# Patient Record
Sex: MALE | Race: WHITE | NOT HISPANIC OR LATINO | Employment: UNEMPLOYED | URBAN - METROPOLITAN AREA
[De-identification: names, ages, dates, MRNs, and addresses within clinical notes are randomized per-mention and may not be internally consistent; named-entity substitution may affect disease eponyms.]

---

## 2023-10-18 ENCOUNTER — OFFICE VISIT (OUTPATIENT)
Dept: MEDICAL GROUP | Facility: MEDICAL CENTER | Age: 53
End: 2023-10-18
Payer: MEDICAID

## 2023-10-18 VITALS
HEIGHT: 70 IN | DIASTOLIC BLOOD PRESSURE: 80 MMHG | TEMPERATURE: 97 F | WEIGHT: 134.4 LBS | SYSTOLIC BLOOD PRESSURE: 130 MMHG | RESPIRATION RATE: 16 BRPM | OXYGEN SATURATION: 96 % | HEART RATE: 88 BPM | BODY MASS INDEX: 19.24 KG/M2

## 2023-10-18 DIAGNOSIS — Z12.11 SCREENING FOR COLORECTAL CANCER: ICD-10-CM

## 2023-10-18 DIAGNOSIS — R06.02 SHORTNESS OF BREATH: ICD-10-CM

## 2023-10-18 DIAGNOSIS — Z12.12 SCREENING FOR COLORECTAL CANCER: ICD-10-CM

## 2023-10-18 DIAGNOSIS — Z13.21 SCREENING FOR ENDOCRINE, NUTRITIONAL, METABOLIC AND IMMUNITY DISORDER: ICD-10-CM

## 2023-10-18 DIAGNOSIS — Z13.29 SCREENING FOR ENDOCRINE, NUTRITIONAL, METABOLIC AND IMMUNITY DISORDER: ICD-10-CM

## 2023-10-18 DIAGNOSIS — Z11.59 NEED FOR HEPATITIS C SCREENING TEST: ICD-10-CM

## 2023-10-18 DIAGNOSIS — R39.15 URINARY URGENCY: ICD-10-CM

## 2023-10-18 DIAGNOSIS — Z23 NEED FOR VACCINATION: ICD-10-CM

## 2023-10-18 DIAGNOSIS — Z13.0 SCREENING FOR ENDOCRINE, NUTRITIONAL, METABOLIC AND IMMUNITY DISORDER: ICD-10-CM

## 2023-10-18 DIAGNOSIS — Z11.3 SCREENING EXAMINATION FOR SEXUALLY TRANSMITTED DISEASE: ICD-10-CM

## 2023-10-18 DIAGNOSIS — Z13.228 SCREENING FOR ENDOCRINE, NUTRITIONAL, METABOLIC AND IMMUNITY DISORDER: ICD-10-CM

## 2023-10-18 PROCEDURE — 99204 OFFICE O/P NEW MOD 45 MIN: CPT

## 2023-10-18 PROCEDURE — 3079F DIAST BP 80-89 MM HG: CPT

## 2023-10-18 PROCEDURE — 90471 IMMUNIZATION ADMIN: CPT

## 2023-10-18 PROCEDURE — 99205 OFFICE O/P NEW HI 60 MIN: CPT

## 2023-10-18 PROCEDURE — 3075F SYST BP GE 130 - 139MM HG: CPT

## 2023-10-18 RX ORDER — TAMSULOSIN HYDROCHLORIDE 0.4 MG/1
0.4 CAPSULE ORAL
Qty: 30 CAPSULE | Refills: 5 | Status: SHIPPED | OUTPATIENT
Start: 2023-10-18 | End: 2023-11-14 | Stop reason: SDUPTHER

## 2023-10-18 RX ORDER — ALBUTEROL SULFATE 90 UG/1
2 AEROSOL, METERED RESPIRATORY (INHALATION) EVERY 6 HOURS PRN
Qty: 8.5 G | Refills: 5 | Status: SHIPPED | OUTPATIENT
Start: 2023-10-18 | End: 2023-12-22 | Stop reason: SDUPTHER

## 2023-10-18 RX ORDER — ALBUTEROL SULFATE 90 UG/1
2 AEROSOL, METERED RESPIRATORY (INHALATION) EVERY 6 HOURS PRN
Qty: 8.5 G | Refills: 5 | Status: SHIPPED | OUTPATIENT
Start: 2023-10-18 | End: 2023-10-18

## 2023-10-18 ASSESSMENT — ENCOUNTER SYMPTOMS
DIZZINESS: 0
VOMITING: 0
BLURRED VISION: 0
COUGH: 1
WHEEZING: 1
PALPITATIONS: 0
CHILLS: 0
FEVER: 0
HEMOPTYSIS: 0
HEADACHES: 0
ABDOMINAL PAIN: 0
SORE THROAT: 0
SPUTUM PRODUCTION: 1
NAUSEA: 0
SHORTNESS OF BREATH: 1
MYALGIAS: 0

## 2023-10-18 ASSESSMENT — PATIENT HEALTH QUESTIONNAIRE - PHQ9
5. POOR APPETITE OR OVEREATING: 3 - NEARLY EVERY DAY
CLINICAL INTERPRETATION OF PHQ2 SCORE: 1
SUM OF ALL RESPONSES TO PHQ QUESTIONS 1-9: 10

## 2023-10-18 NOTE — PROGRESS NOTES
"Subjective:     CC:  Diagnoses of Urinary urgency, Shortness of breath, Screening for colorectal cancer, Screening examination for sexually transmitted disease, Need for hepatitis C screening test, Screening for endocrine, nutritional, metabolic and immunity disorder, and Need for vaccination were pertinent to this visit.    HISTORY OF THE PRESENT ILLNESS: Patient is a 53 y.o. male. This pleasant patient is here today to establish care and discuss urinary urgency.    Problem   Urinary Urgency    Patient reports that this year he has noticed some urinary urgency, frequency, and hesitancy. He feels that its related to his prostate. He denies any blood in his urine. He reports it as being like \"milking a cow\". He has difficulty initiating and maintaining his stream.      Shortness of Breath    This is a acute on chronic condition.    Onset: Symptoms present since age 52  Symptoms include:  Cough: +   Wheezing: +  Details: worse on the back, worse with URI, worse with exercise, and worse when on his side  Severity:   They occur 3 per day and are gradually worsening.  Problems with exercise induced coughing, wheezing, or shortness of breath?  Yes, describe   Has sleep been disturbed due to symptoms: No    Current medications: none         Health Maintenance: Completed    ROS:   Review of Systems   Constitutional:  Negative for chills and fever.   HENT:  Negative for sore throat.    Eyes:  Negative for blurred vision.   Respiratory:  Positive for cough, sputum production, shortness of breath and wheezing. Negative for hemoptysis.    Cardiovascular:  Negative for chest pain, palpitations and leg swelling.   Gastrointestinal:  Negative for abdominal pain, nausea and vomiting.   Genitourinary:  Positive for frequency. Negative for dysuria.   Musculoskeletal:  Negative for myalgias.   Skin:  Negative for rash.   Neurological:  Negative for dizziness and headaches.               Social History     Socioeconomic History    " "Marital status: Single     Spouse name: Not on file    Number of children: Not on file    Years of education: Not on file    Highest education level: Not on file   Occupational History    Not on file   Tobacco Use    Smoking status: Every Day     Current packs/day: 0.10     Average packs/day: 0.1 packs/day for 35.8 years (3.6 ttl pk-yrs)     Types: Cigarettes     Start date: 1988    Smokeless tobacco: Never   Substance and Sexual Activity    Alcohol use: Never    Drug use: Yes     Frequency: 7.0 times per week     Types: Marijuana    Sexual activity: Not Currently   Other Topics Concern    Not on file   Social History Narrative    Not on file     Social Determinants of Health     Financial Resource Strain: Not on file   Food Insecurity: Not on file   Transportation Needs: Not on file   Physical Activity: Not on file   Stress: Not on file   Social Connections: Not on file   Intimate Partner Violence: Not on file   Housing Stability: Not on file      Allergies   Allergen Reactions    Sucralose             Current Outpatient Medications:     tamsulosin, 0.4 mg, Oral, AFTER BREAKFAST    albuterol, 2 Puff, Inhalation, Q6HRS PRN   Objective:     Exam: /80 (BP Location: Right arm, Patient Position: Sitting, BP Cuff Size: Adult)   Pulse 88   Temp 36.1 °C (97 °F) (Temporal)   Resp 16   Ht 1.778 m (5' 10\")   Wt 61 kg (134 lb 6.4 oz)   SpO2 96%  Body mass index is 19.28 kg/m².    Physical Exam  Constitutional:       Appearance: Normal appearance.   HENT:      Head: Normocephalic and atraumatic.      Right Ear: Tympanic membrane and ear canal normal.      Left Ear: Tympanic membrane and ear canal normal.      Mouth/Throat:      Mouth: Mucous membranes are moist.      Pharynx: Oropharynx is clear.   Eyes:      Extraocular Movements: Extraocular movements intact.      Pupils: Pupils are equal, round, and reactive to light.   Neck:      Thyroid: No thyromegaly.   Cardiovascular:      Rate and Rhythm: Normal rate and " regular rhythm.      Heart sounds: Normal heart sounds.   Pulmonary:      Effort: Pulmonary effort is normal.      Breath sounds: Wheezing and rhonchi present.   Abdominal:      General: Abdomen is flat. Bowel sounds are normal.      Palpations: Abdomen is soft.   Musculoskeletal:      Cervical back: Normal range of motion and neck supple.      Right lower leg: No edema.      Left lower leg: No edema.   Lymphadenopathy:      Cervical: No cervical adenopathy.   Skin:     General: Skin is warm and dry.   Neurological:      General: No focal deficit present.      Mental Status: He is alert.           Labs: Reviewed    Assessment & Plan:   53 y.o. male with the following -      1. Urinary urgency  Newly reported, stable.  Patient reports that its been worsening over time reports faculty initiating and maintaining his stream.  He feels that it is related to his prostate.  He denies any hematuria or dysuria.  - PROSTATE SPECIFIC AG SCREENING  - tamsulosin (FLOMAX) 0.4 MG capsule; Take 1 Capsule by mouth 1/2 hour after breakfast.  Dispense: 30 Capsule; Refill: 5    2. Shortness of breath  Newly reported, stable.  Patient reports exertional dyspnea and cough.  He has smoked for 30+ years and continues to smoke.  While he has reduced his cigarette intake to 3 cigarettes/day he is not ready to quit yet.  We discussed the likelihood that his illness is COPD.  Discussed as needed albuterol use when short of breath.  - albuterol 108 (90 Base) MCG/ACT Aero Soln inhalation aerosol; Inhale 2 Puffs every 6 hours as needed for Shortness of Breath.  Dispense: 8.5 g; Refill: 5    3. Screening for colorectal cancer  - OCCULT BLOOD FECES IMMUNOASSAY (FIT); Future    4. Screening examination for sexually transmitted disease    - HIV AG/AB COMBO ASSAY SCREENING; Future  - T.PALLIDUM AB JAKE (SCREENING)    5. Need for hepatitis C screening test  - HEP C VIRUS ANTIBODY; Future    6. Screening for endocrine, nutritional, metabolic and immunity  disorder  - Lipid Profile; Future  - HEMOGLOBIN A1C; Future  - Comp Metabolic Panel; Future  - CBC WITHOUT DIFFERENTIAL    7. Need for vaccination  - Tdap Vaccine =>6YO IM    I spent a total of 61 minutes with record review, exam, communication with the patient, communication with other providers, and documentation of this encounter.      Return in about 1 week (around 10/25/2023).    Please note that this dictation was created using voice recognition software. I have made every reasonable attempt to correct obvious errors, but I expect that there are errors of grammar and possibly content that I did not discover before finalizing the note.

## 2023-10-25 ENCOUNTER — OFFICE VISIT (OUTPATIENT)
Dept: MEDICAL GROUP | Facility: MEDICAL CENTER | Age: 53
End: 2023-10-25
Payer: MEDICAID

## 2023-10-25 VITALS
HEIGHT: 70 IN | DIASTOLIC BLOOD PRESSURE: 80 MMHG | SYSTOLIC BLOOD PRESSURE: 110 MMHG | HEART RATE: 88 BPM | TEMPERATURE: 97.9 F | BODY MASS INDEX: 19.1 KG/M2 | WEIGHT: 133.4 LBS | OXYGEN SATURATION: 94 % | RESPIRATION RATE: 16 BRPM

## 2023-10-25 DIAGNOSIS — R06.02 SHORTNESS OF BREATH: ICD-10-CM

## 2023-10-25 DIAGNOSIS — R59.1 LYMPHADENOPATHY: ICD-10-CM

## 2023-10-25 DIAGNOSIS — R63.4 UNINTENTIONAL WEIGHT LOSS: ICD-10-CM

## 2023-10-25 DIAGNOSIS — Z23 NEED FOR VACCINATION: ICD-10-CM

## 2023-10-25 PROCEDURE — 3074F SYST BP LT 130 MM HG: CPT

## 2023-10-25 PROCEDURE — 99214 OFFICE O/P EST MOD 30 MIN: CPT

## 2023-10-25 PROCEDURE — 3079F DIAST BP 80-89 MM HG: CPT

## 2023-10-25 PROCEDURE — 99213 OFFICE O/P EST LOW 20 MIN: CPT | Mod: 25

## 2023-10-25 PROCEDURE — 90471 IMMUNIZATION ADMIN: CPT

## 2023-10-25 RX ORDER — INHALER, ASSIST DEVICES
1 SPACER (EA) MISCELLANEOUS ONCE
Qty: 1 EACH | Refills: 0 | Status: SHIPPED | OUTPATIENT
Start: 2023-10-25 | End: 2023-10-25

## 2023-10-25 RX ORDER — BUDESONIDE AND FORMOTEROL FUMARATE DIHYDRATE 80; 4.5 UG/1; UG/1
2 AEROSOL RESPIRATORY (INHALATION) 2 TIMES DAILY
Qty: 6.9 G | Refills: 5 | Status: SHIPPED | OUTPATIENT
Start: 2023-10-25 | End: 2023-12-22 | Stop reason: SDUPTHER

## 2023-10-25 ASSESSMENT — ENCOUNTER SYMPTOMS
NAUSEA: 0
PALPITATIONS: 0
VOMITING: 0
SHORTNESS OF BREATH: 1
WHEEZING: 0
WEIGHT LOSS: 1
BLOOD IN STOOL: 0
CHILLS: 0
SPUTUM PRODUCTION: 1
COUGH: 1
HEMOPTYSIS: 0
FEVER: 0
DEPRESSION: 0

## 2023-10-25 NOTE — PROGRESS NOTES
"Subjective:     CC: follow up on weight loss    HPI:   Gabriel presents today with    Problem   Unintentional Weight Loss    Patient first noticed his weight loss early this year. He has lost 10 lbs. He reports  Lower appetite but other than that minimal changes. He uses marijuana and tobacco and occasional cocaine use which affect his appetite for a few days. He reports that he gets full fast and even with small portions he gets full.     Medications: None   Emotional: He reports it may be related to his mood, frustration and energy levels.    Alcoholism: None   Late-life paranoia or bereavement: None   Swallowing problems: Denies       Oral factors: bad teeth but not a new problem   Nosocomial infections: Denies       Wandering and other dementia-related factors: Denies   Hyperthyroidism, hypercalcemia, hypoadrenalism: None   Enteral problems: Denies any swallowing or bowel problems   Eating problems: Denies   Low salt, low cholesterol: No special diet   Social isolation: \"a little\"          Lymphadenopathy    Patient reports swollen lymphnodes at the base of his skull, below his jaw, and down his neck. He is concerned as they fluctuate in size getting as large as a golf ball per his daughter. He reports that they are painful but he never has S/S of infection. He reports he usually goes to bed an they are resolved in the morning.      Shortness of Breath    This is a acute on chronic condition.    Onset: Symptoms present since age 52  Symptoms include:   Cough, Wheezing, SOB and sputum production.  He reports the symptoms are worse with exceriton and in the morning.   Severity: he has been using his inhaler multiple times daily with moderate relief of symptoms.   Has sleep been disturbed due to symptoms: No    Current medications: albuterol PRN               ROS:  Review of Systems   Constitutional:  Positive for malaise/fatigue and weight loss. Negative for chills and fever.   Respiratory:  Positive for cough, " "sputum production and shortness of breath. Negative for hemoptysis and wheezing.    Cardiovascular:  Negative for chest pain and palpitations.   Gastrointestinal:  Negative for blood in stool, melena, nausea and vomiting.   Genitourinary:  Positive for frequency.   Psychiatric/Behavioral:  Negative for depression.         Please see HPI for additional ROS.       Objective:     Exam:  /80 (BP Location: Right arm, Patient Position: Sitting, BP Cuff Size: Adult)   Pulse 88   Temp 36.6 °C (97.9 °F) (Temporal)   Resp 16   Ht 1.778 m (5' 10\")   Wt 60.5 kg (133 lb 6.4 oz)   SpO2 94%   BMI 19.14 kg/m²  Body mass index is 19.14 kg/m².    Physical Exam  Constitutional:       Appearance: Normal appearance.   Neck:     Cardiovascular:      Rate and Rhythm: Normal rate and regular rhythm.      Heart sounds: Normal heart sounds.   Pulmonary:      Effort: Pulmonary effort is normal.      Breath sounds: Normal breath sounds.   Musculoskeletal:      Cervical back: Normal range of motion and neck supple. Pain with movement present.   Lymphadenopathy:      Cervical: Cervical adenopathy present.      Right cervical: Superficial cervical adenopathy present.      Left cervical: Posterior cervical adenopathy present.   Neurological:      Mental Status: He is alert.         Labs: Reviewed    Assessment & Plan:     53 y.o. male with the following -     1. Unintentional weight loss  Chronic, unsure etiology, prognosis uncertain.  Patient reports losing over 10 pounds in the last few months.  His diet has not changed much.  When reviewing the 'Meals on Wheels', only abnormal finding was his relation to his mood and his appetite.  He reports getting full fast.  Given his weight loss, lymphadenopathy, and smoking history labs and imaging ordered.  Follow-up precautions given.  - TSH WITH REFLEX TO FT4; Future  - CRP QUANTITIVE (NON-CARDIAC); Future  - DX-CHEST-2 VIEWS; Future  - Sed Rate; Future    2. Lymphadenopathy  Chronic, " unsure etiology, prognosis uncertain.  Patient has cervical adenopathy.  Right-sided superficial cervical lymph node swelling and posterior cervical lymph node swelling on the left.    3. Shortness of breath  Chronic, improving.  Patient reports that since he started taking the albuterol his shortness of breath has improved some but he is using the albuterol for as times per day.  He reports chronic cough chronic shortness of breath and chronic sputum production.  Given his extensive smoking history it is likely that he has chronic lung disease.  Pulmonary function test, chest x-ray, Symbicort ordered in addition to a spacer.  Pneumococcal 20 vaccine given today patient denies influenza vaccination.  - DX-CHEST-2 VIEWS; Future  - PULMONARY FUNCTION TESTS -Test requested: Complete Pulmonary Function Test; Future  - budesonide-formoterol (SYMBICORT) 80-4.5 MCG/ACT Aerosol; Inhale 2 Puffs 2 times a day.  Dispense: 6.9 g; Refill: 5  - Spacer/Aero-Holding Chambers (AEROCHAMBER MV) Misc; 1 Each one time for 1 dose.  Dispense: 1 Each; Refill: 0    4. Need for vaccination  - Pneumococcal Conjugate Vaccine 20-Valent (6 wks+)            Return in about 4 weeks (around 11/22/2023) for FU Labs, Med Check.    Please note that this dictation was created using voice recognition software. I have made every reasonable attempt to correct obvious errors, but I expect that there are errors of grammar and possibly content that I did not discover before finalizing the note.

## 2023-11-02 ENCOUNTER — HOSPITAL ENCOUNTER (OUTPATIENT)
Dept: RADIOLOGY | Facility: MEDICAL CENTER | Age: 53
End: 2023-11-02
Payer: MEDICAID

## 2023-11-02 DIAGNOSIS — R06.02 SHORTNESS OF BREATH: ICD-10-CM

## 2023-11-02 DIAGNOSIS — R63.4 UNINTENTIONAL WEIGHT LOSS: ICD-10-CM

## 2023-11-02 PROCEDURE — 71046 X-RAY EXAM CHEST 2 VIEWS: CPT

## 2023-11-14 DIAGNOSIS — R39.15 URINARY URGENCY: ICD-10-CM

## 2023-11-14 RX ORDER — TAMSULOSIN HYDROCHLORIDE 0.4 MG/1
0.4 CAPSULE ORAL
Qty: 30 CAPSULE | Refills: 5 | Status: SHIPPED | OUTPATIENT
Start: 2023-11-14

## 2023-11-20 ENCOUNTER — APPOINTMENT (OUTPATIENT)
Dept: PULMONOLOGY | Facility: MEDICAL CENTER | Age: 53
End: 2023-11-20
Payer: MEDICAID

## 2023-12-02 ENCOUNTER — HOSPITAL ENCOUNTER (OUTPATIENT)
Dept: LAB | Facility: MEDICAL CENTER | Age: 53
End: 2023-12-02
Payer: MEDICAID

## 2023-12-02 DIAGNOSIS — Z11.3 SCREENING EXAMINATION FOR SEXUALLY TRANSMITTED DISEASE: ICD-10-CM

## 2023-12-02 DIAGNOSIS — Z13.228 SCREENING FOR ENDOCRINE, NUTRITIONAL, METABOLIC AND IMMUNITY DISORDER: ICD-10-CM

## 2023-12-02 DIAGNOSIS — Z13.21 SCREENING FOR ENDOCRINE, NUTRITIONAL, METABOLIC AND IMMUNITY DISORDER: ICD-10-CM

## 2023-12-02 DIAGNOSIS — Z13.29 SCREENING FOR ENDOCRINE, NUTRITIONAL, METABOLIC AND IMMUNITY DISORDER: ICD-10-CM

## 2023-12-02 DIAGNOSIS — R63.4 UNINTENTIONAL WEIGHT LOSS: ICD-10-CM

## 2023-12-02 DIAGNOSIS — Z13.0 SCREENING FOR ENDOCRINE, NUTRITIONAL, METABOLIC AND IMMUNITY DISORDER: ICD-10-CM

## 2023-12-02 DIAGNOSIS — Z11.59 NEED FOR HEPATITIS C SCREENING TEST: ICD-10-CM

## 2023-12-02 LAB
ALBUMIN SERPL BCP-MCNC: 4.1 G/DL (ref 3.2–4.9)
ALBUMIN/GLOB SERPL: 1.2 G/DL
ALP SERPL-CCNC: 120 U/L (ref 30–99)
ALT SERPL-CCNC: 50 U/L (ref 2–50)
ANION GAP SERPL CALC-SCNC: 11 MMOL/L (ref 7–16)
AST SERPL-CCNC: 40 U/L (ref 12–45)
BILIRUB SERPL-MCNC: 0.3 MG/DL (ref 0.1–1.5)
BUN SERPL-MCNC: 21 MG/DL (ref 8–22)
CALCIUM ALBUM COR SERPL-MCNC: 9.5 MG/DL (ref 8.5–10.5)
CALCIUM SERPL-MCNC: 9.6 MG/DL (ref 8.5–10.5)
CHLORIDE SERPL-SCNC: 104 MMOL/L (ref 96–112)
CHOLEST SERPL-MCNC: 187 MG/DL (ref 100–199)
CO2 SERPL-SCNC: 24 MMOL/L (ref 20–33)
CREAT SERPL-MCNC: 0.85 MG/DL (ref 0.5–1.4)
CRP SERPL HS-MCNC: <0.3 MG/DL (ref 0–0.75)
ERYTHROCYTE [SEDIMENTATION RATE] IN BLOOD BY WESTERGREN METHOD: 6 MM/HOUR (ref 0–20)
FASTING STATUS PATIENT QL REPORTED: NORMAL
GFR SERPLBLD CREATININE-BSD FMLA CKD-EPI: 102 ML/MIN/1.73 M 2
GLOBULIN SER CALC-MCNC: 3.3 G/DL (ref 1.9–3.5)
GLUCOSE SERPL-MCNC: 86 MG/DL (ref 65–99)
HCV AB SER QL: NORMAL
HDLC SERPL-MCNC: 52 MG/DL
HIV 1+2 AB+HIV1 P24 AG SERPL QL IA: ABNORMAL
LDLC SERPL CALC-MCNC: 99 MG/DL
POTASSIUM SERPL-SCNC: 4.2 MMOL/L (ref 3.6–5.5)
PROT SERPL-MCNC: 7.4 G/DL (ref 6–8.2)
SODIUM SERPL-SCNC: 139 MMOL/L (ref 135–145)
TRIGL SERPL-MCNC: 181 MG/DL (ref 0–149)
TSH SERPL DL<=0.005 MIU/L-ACNC: 2.86 UIU/ML (ref 0.38–5.33)

## 2023-12-02 PROCEDURE — 86701 HIV-1ANTIBODY: CPT | Mod: XU

## 2023-12-02 PROCEDURE — 84443 ASSAY THYROID STIM HORMONE: CPT

## 2023-12-02 PROCEDURE — 87536 HIV-1 QUANT&REVRSE TRNSCRPJ: CPT

## 2023-12-02 PROCEDURE — 86803 HEPATITIS C AB TEST: CPT

## 2023-12-02 PROCEDURE — 86140 C-REACTIVE PROTEIN: CPT

## 2023-12-02 PROCEDURE — 86702 HIV-2 ANTIBODY: CPT | Mod: XU

## 2023-12-02 PROCEDURE — 87389 HIV-1 AG W/HIV-1&-2 AB AG IA: CPT

## 2023-12-02 PROCEDURE — 36415 COLL VENOUS BLD VENIPUNCTURE: CPT

## 2023-12-02 PROCEDURE — 80053 COMPREHEN METABOLIC PANEL: CPT

## 2023-12-02 PROCEDURE — 80061 LIPID PANEL: CPT

## 2023-12-02 PROCEDURE — 83036 HEMOGLOBIN GLYCOSYLATED A1C: CPT

## 2023-12-02 PROCEDURE — 85652 RBC SED RATE AUTOMATED: CPT

## 2023-12-03 LAB
EST. AVERAGE GLUCOSE BLD GHB EST-MCNC: 120 MG/DL
HBA1C MFR BLD: 5.8 % (ref 4–5.6)

## 2023-12-04 ENCOUNTER — TELEPHONE (OUTPATIENT)
Dept: MEDICAL GROUP | Facility: MEDICAL CENTER | Age: 53
End: 2023-12-04
Payer: MEDICAID

## 2023-12-04 NOTE — TELEPHONE ENCOUNTER
Message: spoke with lab they have informed me that patient came back for HIV Pos they will be sending lab to more testing charu munson has been informed in person and regards this she stated she will communicate this to patient.

## 2023-12-05 LAB
HIV 1 & 2 AB SER-IMP: NORMAL
HIV 1 & 2 AB SERPL IA.RAPID: NORMAL
HIV 2 AB SERPL QL IA: NEGATIVE
HIV1 AB SERPL QL IA: NEGATIVE

## 2023-12-07 LAB
HIV-1 NAAT (COPIES/ML) L204479A: NOT DETECTED CPY/ML
HIV-1 NAAT (LOG COPIES/ML) L295410: NOT DETECTED LOG CPY/ML
HIV1 RNA SERPL QL NAA+PROBE: NOT DETECTED

## 2023-12-11 ENCOUNTER — HOSPITAL ENCOUNTER (OUTPATIENT)
Dept: PULMONOLOGY | Facility: MEDICAL CENTER | Age: 53
End: 2023-12-11
Payer: MEDICAID

## 2023-12-11 DIAGNOSIS — R06.02 SHORTNESS OF BREATH: ICD-10-CM

## 2023-12-11 PROCEDURE — 94060 EVALUATION OF WHEEZING: CPT

## 2023-12-11 PROCEDURE — 94729 DIFFUSING CAPACITY: CPT

## 2023-12-11 PROCEDURE — 94726 PLETHYSMOGRAPHY LUNG VOLUMES: CPT

## 2023-12-11 RX ADMIN — Medication 2.5 MG: at 12:18

## 2023-12-11 RX ADMIN — Medication 2.5 MG: at 11:19

## 2023-12-11 ASSESSMENT — PULMONARY FUNCTION TESTS
FVC_PERCENT_PREDICTED: 95
FEV1/FVC: 72.14
FEV1/FVC_PERCENT_CHANGE: 175
FVC_LLN: 4.03
FVC_PREDICTED: 4.83
FEV1/FVC_PERCENT_PREDICTED: 89
FEV1/FVC_PERCENT_LLN: 65
FVC_PERCENT_PREDICTED: 91
FVC: 4.41
FEV1/FVC: 69
FEV1/FVC_PERCENT_PREDICTED: 78
FVC_LLN: 4.03
FEV1/FVC_PREDICTED: 78
FEV1/FVC_PERCENT_CHANGE: 4
FEV1_PERCENT_CHANGE: 4
FEV1/FVC_PERCENT_PREDICTED: 93
FEV1_PERCENT_PREDICTED: 81
FEV1_PERCENT_PREDICTED: 88
FEV1/FVC: 72
FEV1: 3.06
FEV1_LLN: 3.15
FEV1/FVC: 69
FEV1: 3.34
FEV1/FVC_PERCENT_PREDICTED: 88
FEV1_PREDICTED: 3.77
FVC: 4.63
FEV1/FVC_PERCENT_LLN: 65
FEV1/FVC_PERCENT_PREDICTED: 92
FEV1_PERCENT_CHANGE: 7
FEV1_LLN: 3.15

## 2023-12-12 ENCOUNTER — OFFICE VISIT (OUTPATIENT)
Dept: MEDICAL GROUP | Facility: MEDICAL CENTER | Age: 53
End: 2023-12-12
Payer: MEDICAID

## 2023-12-12 VITALS
WEIGHT: 134.5 LBS | HEART RATE: 85 BPM | OXYGEN SATURATION: 94 % | SYSTOLIC BLOOD PRESSURE: 105 MMHG | DIASTOLIC BLOOD PRESSURE: 80 MMHG | RESPIRATION RATE: 16 BRPM | HEIGHT: 70 IN | TEMPERATURE: 98.6 F | BODY MASS INDEX: 19.26 KG/M2

## 2023-12-12 DIAGNOSIS — R73.03 PREDIABETES: ICD-10-CM

## 2023-12-12 DIAGNOSIS — J41.8 MIXED SIMPLE AND MUCOPURULENT CHRONIC BRONCHITIS (HCC): ICD-10-CM

## 2023-12-12 DIAGNOSIS — Z23 NEED FOR VACCINATION: ICD-10-CM

## 2023-12-12 PROCEDURE — 3074F SYST BP LT 130 MM HG: CPT

## 2023-12-12 PROCEDURE — 99213 OFFICE O/P EST LOW 20 MIN: CPT

## 2023-12-12 PROCEDURE — 3079F DIAST BP 80-89 MM HG: CPT

## 2023-12-12 PROCEDURE — 99212 OFFICE O/P EST SF 10 MIN: CPT

## 2023-12-12 ASSESSMENT — ENCOUNTER SYMPTOMS
SHORTNESS OF BREATH: 0
COUGH: 0
CHILLS: 0
PALPITATIONS: 0
FEVER: 0

## 2023-12-12 NOTE — PROGRESS NOTES
"Subjective:     CC: medication follow up and lab review.     HPI:   Gabriel presents today to follow-up on his most recent labs and pulmonary function test.  His initial HIV screening was positive however further testing was negative.  His labs did demonstrate that he has some prediabetes and elevated triglycerides.  Patient reports that he does not eat much red meat smoothies with fruits and vegetables and yogurt.  He also eats nutritional breakfast shakes.  He completed his pulmonary function test yesterday.  He has been using his Symbicort and albuterol as prescribed.  He did report that the 24 hours leading up to his pulmonary function test when he was not using the medication he did notice how much the medication will help him with his daily symptoms.      ROS:  Review of Systems   Constitutional:  Negative for chills and fever.   Respiratory:  Negative for cough and shortness of breath.    Cardiovascular:  Negative for chest pain and palpitations.        Please see HPI for additional ROS.       Objective:     Exam:  /80 (BP Location: Right arm, Patient Position: Sitting, BP Cuff Size: Adult)   Pulse 85   Temp 37 °C (98.6 °F) (Temporal)   Resp 16   Ht 1.778 m (5' 10\")   Wt 61 kg (134 lb 8 oz)   SpO2 94%   BMI 19.30 kg/m²  Body mass index is 19.3 kg/m².    Physical Exam:  General: Pt resting in NAD, cooperative   Skin:  No cyanosis or jaundice   HEENT: NC/AT. EOMI. No conjunctival injection or sclera icterus.   Lungs:  CTAB, good air movement. Non-labored. +cough  Cardiovascular:  S1/S2 RRR   Abdomen:  Abdomen is soft, non-tender, non-distended, +BS  Extremities:  No lower extremity edema   CNS:  No gross focal neurologic deficits  Psych: Appropriate mood and affect     Labs: Reviewed    Assessment & Plan:     53 y.o. male with the following -     1. Mixed simple and mucopurulent chronic bronchitis (HCC)  Chronic, well controlled with albuterol and Symbicort. Patient denies any negative side effects " Impression: Presbyopia: H52.4. Plan: Patient educated on condition, updated and released SRx. Monitor. from the medication and would like to continue. Follow up precautions given.  Patient declines Pneumovax or flu shot.    2. Need for vaccination  Patient requests vaccination today.  Vaccine not available in the office.  Prescription given.- Zoster Vac Recomb Adjuvanted (SHINGRIX) 50 MCG/0.5ML Recon Susp; Inject 0.5 mL into the shoulder, thigh, or buttocks one time for 1 dose.  Dispense: 0.5 mL; Refill: 0    3. Prediabetes  Hemoglobin A1c 5.8.  Dietary and lifestyle modifications discussed with patient.        Return in about 6 months (around 6/12/2024), or Weight check.    Please note that this dictation was created using voice recognition software. I have made every reasonable attempt to correct obvious errors, but I expect that there are errors of grammar and possibly content that I did not discover before finalizing the note.

## 2023-12-16 PROCEDURE — 94060 EVALUATION OF WHEEZING: CPT | Mod: 26 | Performed by: INTERNAL MEDICINE

## 2023-12-17 NOTE — PROCEDURES
DATE OF SERVICE:  12/11/2023     PULMONARY FUNCTION TEST INTERPRETATION     INTERPRETING PHYSICIAN:  Karen Lomas MD     REASON FOR STUDY:  Shortness of breath.     RESULTS:  SPIROMETRY:  FVC is 4.41 liters, which is 91% predicted without a significant change   postbronchodilator.  FEV1 is 3.06 liters, which is 81% predicted without a significant change   postbronchodilator.  FEV1/FVC is 72.     LUNG VOLUMES:  TLC is 6.79 liters, which is 97% predicted.  RV is 2.34 liters, which is 109% predicted.     DIFFUSION CAPACITY:  DLCO is 28.14, which is 98% predicted.  DL/VA is 4.49, which is 109% predicted.     INTERPRETATION:  1.  Spirometry is normal.  2.  There is no significant change postbronchodilator.  3.  The flow volume loop is incomplete and flattened suggesting possible   extrathoracic obstruction, correlate clinically.  4.  Lung volumes are normal.  5.  Diffusion capacity is normal  6. There are no prior studies for comparison      Dictation Ends Here        ______________________________  MD IVÁN Reed/BELLA    DD:  12/16/2023 17:09  DT:  12/16/2023 18:12    Job#:  [id number]

## 2023-12-22 DIAGNOSIS — R06.02 SHORTNESS OF BREATH: ICD-10-CM

## 2023-12-22 RX ORDER — BUDESONIDE AND FORMOTEROL FUMARATE DIHYDRATE 80; 4.5 UG/1; UG/1
2 AEROSOL RESPIRATORY (INHALATION) 2 TIMES DAILY
Qty: 6.9 G | Refills: 5 | Status: SHIPPED | OUTPATIENT
Start: 2023-12-22

## 2023-12-22 RX ORDER — ALBUTEROL SULFATE 90 UG/1
2 AEROSOL, METERED RESPIRATORY (INHALATION) EVERY 6 HOURS PRN
Qty: 8.5 G | Refills: 5 | Status: SHIPPED | OUTPATIENT
Start: 2023-12-22 | End: 2024-01-28 | Stop reason: SDUPTHER

## 2024-01-28 DIAGNOSIS — R06.02 SHORTNESS OF BREATH: ICD-10-CM

## 2024-01-30 RX ORDER — ALBUTEROL SULFATE 90 UG/1
2 AEROSOL, METERED RESPIRATORY (INHALATION) EVERY 6 HOURS PRN
Qty: 8.5 G | Refills: 5 | Status: SHIPPED | OUTPATIENT
Start: 2024-01-30

## 2024-01-30 NOTE — TELEPHONE ENCOUNTER
Received request via: Pharmacy    Was the patient seen in the last year in this department? Yes    Does the patient have an active prescription (recently filled or refills available) for medication(s) requested? No    Pharmacy Name: [facility]     Does the patient have halfway Plus and need 100 day supply (blood pressure, diabetes and cholesterol meds only)? Patient does not have SCP

## 2024-09-11 ENCOUNTER — OFFICE VISIT (OUTPATIENT)
Dept: MEDICAL GROUP | Facility: MEDICAL CENTER | Age: 54
End: 2024-09-11
Payer: MEDICAID

## 2024-09-11 VITALS
RESPIRATION RATE: 16 BRPM | HEART RATE: 102 BPM | HEIGHT: 71 IN | DIASTOLIC BLOOD PRESSURE: 76 MMHG | BODY MASS INDEX: 19.18 KG/M2 | SYSTOLIC BLOOD PRESSURE: 108 MMHG | OXYGEN SATURATION: 97 % | WEIGHT: 137 LBS | TEMPERATURE: 97 F

## 2024-09-11 DIAGNOSIS — Z13.29 SCREENING FOR ENDOCRINE, NUTRITIONAL, METABOLIC AND IMMUNITY DISORDER: ICD-10-CM

## 2024-09-11 DIAGNOSIS — R39.15 URINARY URGENCY: ICD-10-CM

## 2024-09-11 DIAGNOSIS — Z13.21 SCREENING FOR ENDOCRINE, NUTRITIONAL, METABOLIC AND IMMUNITY DISORDER: ICD-10-CM

## 2024-09-11 DIAGNOSIS — R06.02 SHORTNESS OF BREATH: ICD-10-CM

## 2024-09-11 DIAGNOSIS — Z13.0 SCREENING FOR ENDOCRINE, NUTRITIONAL, METABOLIC AND IMMUNITY DISORDER: ICD-10-CM

## 2024-09-11 DIAGNOSIS — Z13.228 SCREENING FOR ENDOCRINE, NUTRITIONAL, METABOLIC AND IMMUNITY DISORDER: ICD-10-CM

## 2024-09-11 PROCEDURE — 99214 OFFICE O/P EST MOD 30 MIN: CPT

## 2024-09-11 PROCEDURE — 99212 OFFICE O/P EST SF 10 MIN: CPT

## 2024-09-11 PROCEDURE — 3074F SYST BP LT 130 MM HG: CPT

## 2024-09-11 PROCEDURE — 3078F DIAST BP <80 MM HG: CPT

## 2024-09-11 RX ORDER — TAMSULOSIN HYDROCHLORIDE 0.4 MG/1
0.8 CAPSULE ORAL
Qty: 60 CAPSULE | Refills: 5 | Status: SHIPPED | OUTPATIENT
Start: 2024-09-11

## 2024-09-11 RX ORDER — ALBUTEROL SULFATE 90 UG/1
2 INHALANT RESPIRATORY (INHALATION) EVERY 6 HOURS PRN
Qty: 8.5 G | Refills: 5 | Status: SHIPPED | OUTPATIENT
Start: 2024-09-11

## 2024-09-11 RX ORDER — BUDESONIDE AND FORMOTEROL FUMARATE DIHYDRATE 80; 4.5 UG/1; UG/1
2 AEROSOL RESPIRATORY (INHALATION) 2 TIMES DAILY
Qty: 6.9 G | Refills: 5 | Status: SHIPPED | OUTPATIENT
Start: 2024-09-11

## 2024-09-11 ASSESSMENT — PATIENT HEALTH QUESTIONNAIRE - PHQ9: CLINICAL INTERPRETATION OF PHQ2 SCORE: 0

## 2024-09-12 NOTE — PROGRESS NOTES
Verbal consent was acquired by the patient to use [employer] ambient listening note generation during this visit     Subjective:     CC:  Diagnoses of Urinary urgency, Shortness of breath, and Screening for endocrine, nutritional, metabolic and immunity disorder were pertinent to this visit.    HISTORY OF THE PRESENT ILLNESS: Patient is a 54 y.o. male.     History of Present Illness  The patient presents for evaluation of prostate issues.    He reports ongoing problems with his prostate, which he believes may require an increase in medication dosage. He admits to occasionally forgetting to take his medication, and when he remembers, he takes a double dose, which seems to alleviate his symptoms. However, on days when he doesn't take the medication, his symptoms worsen significantly. Even on normal days when he takes his medication as prescribed, he still experiences some discomfort. He describes feeling a lump or obstruction a few times, particularly on days when he ran out of his medication. He has not yet undergone a colonoscopy or completed his PSA test. He also mentions that he often needs to perform aerobic exercises to facilitate urination, which he finds abnormal. He reports no presence of blood or unusual discharge.  I like that it sends reminders to use it for 1 minute/day and the jobs something similar that way and    His weight has been fluctuating, but he currently weighs 138 pounds, up from 134 pounds last year. He notes that his ideal weight is around 140 to 142 pounds.    He is just getting over a migraine from yesterday. He is on the last refill of albuterol.    Health Maintenance: reviewed and completed per patient preference.     ROS:   - CONSTITUTIONAL: Denies weight loss, fever and chills.  - HEENT: Denies changes in vision and hearing.  - RESPIRATORY: Denies SOB and cough.  - CV: Denies palpitations and CP.  - GI: Denies abdominal pain, nausea, vomiting and diarrhea.  - : Denies dysuria and  "urinary frequency.  - MSK: Denies myalgia and joint pain.  - SKIN: Denies rash and pruritus.  - NEUROLOGICAL: Denies headache and syncope.  - PSYCHIATRIC: Denies recent changes in mood. Denies anxiety and depression.           Social History     Socioeconomic History    Marital status:      Spouse name: Not on file    Number of children: Not on file    Years of education: Not on file    Highest education level: Not on file   Occupational History    Not on file   Tobacco Use    Smoking status: Every Day     Current packs/day: 0.10     Average packs/day: 0.1 packs/day for 36.7 years (3.7 ttl pk-yrs)     Types: Cigarettes     Start date: 1988    Smokeless tobacco: Never   Vaping Use    Vaping status: Never Used   Substance and Sexual Activity    Alcohol use: Not Currently    Drug use: Yes     Frequency: 7.0 times per week     Types: Marijuana    Sexual activity: Not Currently   Other Topics Concern    Not on file   Social History Narrative    Not on file     Social Determinants of Health     Financial Resource Strain: Not on file   Food Insecurity: Not on file   Transportation Needs: Not on file   Physical Activity: Not on file   Stress: Not on file   Social Connections: Not on file   Intimate Partner Violence: Not on file   Housing Stability: Not on file      Allergies   Allergen Reactions    Sucralose             Current Outpatient Medications:     tamsulosin, 0.8 mg, Oral, AFTER BREAKFAST    albuterol, 2 Puff, Inhalation, Q6HRS PRN    budesonide-formoterol, 2 Puff, Inhalation, BID   Objective:     Exam: /76 (BP Location: Left arm, Patient Position: Sitting, BP Cuff Size: Adult)   Pulse (!) 102   Temp 36.1 °C (97 °F) (Temporal)   Resp 16   Ht 1.803 m (5' 11\")   Wt 62.1 kg (137 lb)   SpO2 97%  Body mass index is 19.11 kg/m².    Physical Exam:  Constitutional: Alert, no distress, well-groomed.  Skin: Warm, dry, good turgor, no rashes in visible areas.  Eye: Equal, round and reactive, conjunctiva " clear, lids normal.  ENMT: Lips without lesions, good dentition, moist mucous membranes.  Neck: Trachea midline, no masses, no thyromegaly.  Respiratory: Unlabored respiratory effort, no cough.  Abd: soft, non tender, non distended, normal BS  MSK: Normal gait, moves all extremities.  Neuro: Grossly non-focal.   Psych: Alert and oriented x3, normal affect and mood.    Labs: Reviewed    Assessment & Plan:   54 y.o. male with the following -    1. Urinary urgency  Ongoing Issue, He reports intermittent symptoms and occasional obstruction sensations. He has been doubling his Flomax dose on some days, which seems to alleviate symptoms. A PSA test will be conducted to assess prostate health. Flomax dosage will be increased to 0.8 mg, two tablets once daily. A referral to urology will be made after obtaining PSA results. He is advised to complete the blood work as soon as possible.   - PROSTATE SPECIFIC AG DIAGNOSTIC; Future  - tamsulosin (FLOMAX) 0.4 MG capsule; Take 2 Capsules by mouth 1/2 hour after breakfast.  Dispense: 60 Capsule; Refill: 5    2. Shortness of breath  Chronic, stable. His symptoms are present weekly and are exacerbated by exertion. It was recommended that he avoid sick people, allergens such as dander and mold, and avoid smoke. We discussed signs and symptoms that warrant further evaluation.   - albuterol 108 (90 Base) MCG/ACT Aero Soln inhalation aerosol; Inhale 2 Puffs every 6 hours as needed for Shortness of Breath.  Dispense: 8.5 g; Refill: 5  - budesonide-formoterol (SYMBICORT) 80-4.5 MCG/ACT Aerosol; Inhale 2 Puffs 2 times a day.  Dispense: 6.9 g; Refill: 5    3. Screening for endocrine, nutritional, metabolic and immunity disorder  - CBC WITHOUT DIFFERENTIAL; Future  - RPR (SYPHILIS); Future        Return if symptoms worsen or fail to improve.    Please note that this dictation was created using voice recognition software. I have made every reasonable attempt to correct obvious errors, but I  expect that there are errors of grammar and possibly content that I did not discover before finalizing the note.

## 2024-11-23 DIAGNOSIS — R39.15 URINARY URGENCY: ICD-10-CM

## 2024-11-25 NOTE — TELEPHONE ENCOUNTER
Received request via: Pharmacy    Was the patient seen in the last year in this department? Yes    Does the patient have an active prescription (recently filled or refills available) for medication(s) requested? No    [facility]    Does the patient have USP Plus and need 100-day supply? (This applies to ALL medications) Patient does not have SCP

## 2024-11-26 RX ORDER — TAMSULOSIN HYDROCHLORIDE 0.4 MG/1
0.4 CAPSULE ORAL
Qty: 30 CAPSULE | Refills: 5 | Status: SHIPPED | OUTPATIENT
Start: 2024-11-26

## 2024-12-11 ENCOUNTER — HOSPITAL ENCOUNTER (OUTPATIENT)
Dept: LAB | Facility: MEDICAL CENTER | Age: 54
End: 2024-12-11
Payer: MEDICAID

## 2024-12-11 DIAGNOSIS — R39.15 URINARY URGENCY: ICD-10-CM

## 2024-12-11 DIAGNOSIS — Z13.0 SCREENING FOR ENDOCRINE, NUTRITIONAL, METABOLIC AND IMMUNITY DISORDER: ICD-10-CM

## 2024-12-11 DIAGNOSIS — Z13.228 SCREENING FOR ENDOCRINE, NUTRITIONAL, METABOLIC AND IMMUNITY DISORDER: ICD-10-CM

## 2024-12-11 DIAGNOSIS — Z13.21 SCREENING FOR ENDOCRINE, NUTRITIONAL, METABOLIC AND IMMUNITY DISORDER: ICD-10-CM

## 2024-12-11 DIAGNOSIS — Z13.29 SCREENING FOR ENDOCRINE, NUTRITIONAL, METABOLIC AND IMMUNITY DISORDER: ICD-10-CM

## 2024-12-11 LAB
ERYTHROCYTE [DISTWIDTH] IN BLOOD BY AUTOMATED COUNT: 43.8 FL (ref 35.9–50)
HCT VFR BLD AUTO: 45.8 % (ref 42–52)
HGB BLD-MCNC: 14.6 G/DL (ref 14–18)
MCH RBC QN AUTO: 29.6 PG (ref 27–33)
MCHC RBC AUTO-ENTMCNC: 31.9 G/DL (ref 32.3–36.5)
MCV RBC AUTO: 92.7 FL (ref 81.4–97.8)
PLATELET # BLD AUTO: 299 K/UL (ref 164–446)
PMV BLD AUTO: 8.9 FL (ref 9–12.9)
PSA SERPL DL<=0.01 NG/ML-MCNC: 17.9 NG/ML (ref 0–4)
RBC # BLD AUTO: 4.94 M/UL (ref 4.7–6.1)
T PALLIDUM AB SER QL IA: NORMAL
WBC # BLD AUTO: 8.6 K/UL (ref 4.8–10.8)

## 2024-12-11 PROCEDURE — 86780 TREPONEMA PALLIDUM: CPT

## 2024-12-11 PROCEDURE — 36415 COLL VENOUS BLD VENIPUNCTURE: CPT

## 2024-12-11 PROCEDURE — 84153 ASSAY OF PSA TOTAL: CPT

## 2024-12-11 PROCEDURE — 85027 COMPLETE CBC AUTOMATED: CPT

## 2024-12-12 ENCOUNTER — HOSPITAL ENCOUNTER (OUTPATIENT)
Facility: MEDICAL CENTER | Age: 54
End: 2024-12-12
Attending: PHYSICIAN ASSISTANT
Payer: MEDICAID

## 2024-12-12 ENCOUNTER — OFFICE VISIT (OUTPATIENT)
Dept: URGENT CARE | Facility: PHYSICIAN GROUP | Age: 54
End: 2024-12-12
Payer: MEDICAID

## 2024-12-12 VITALS
WEIGHT: 144.1 LBS | TEMPERATURE: 98.3 F | HEIGHT: 70 IN | SYSTOLIC BLOOD PRESSURE: 94 MMHG | OXYGEN SATURATION: 95 % | HEART RATE: 99 BPM | DIASTOLIC BLOOD PRESSURE: 82 MMHG | RESPIRATION RATE: 20 BRPM | BODY MASS INDEX: 20.63 KG/M2

## 2024-12-12 DIAGNOSIS — R97.20 HIGH PROSTATE SPECIFIC ANTIGEN (PSA): ICD-10-CM

## 2024-12-12 DIAGNOSIS — R30.0 DYSURIA: ICD-10-CM

## 2024-12-12 LAB
APPEARANCE UR: NORMAL
BILIRUB UR STRIP-MCNC: NEGATIVE MG/DL
COLOR UR AUTO: NORMAL
GLUCOSE UR STRIP.AUTO-MCNC: NEGATIVE MG/DL
KETONES UR STRIP.AUTO-MCNC: NEGATIVE MG/DL
LEUKOCYTE ESTERASE UR QL STRIP.AUTO: NORMAL
NITRITE UR QL STRIP.AUTO: NEGATIVE
PH UR STRIP.AUTO: 5.5 [PH] (ref 5–8)
PROT UR QL STRIP: >=300 MG/DL
RBC UR QL AUTO: NORMAL
SP GR UR STRIP.AUTO: >=1.03
UROBILINOGEN UR STRIP-MCNC: 0.2 MG/DL

## 2024-12-12 PROCEDURE — 81002 URINALYSIS NONAUTO W/O SCOPE: CPT | Performed by: PHYSICIAN ASSISTANT

## 2024-12-12 PROCEDURE — 87077 CULTURE AEROBIC IDENTIFY: CPT

## 2024-12-12 PROCEDURE — 87086 URINE CULTURE/COLONY COUNT: CPT

## 2024-12-12 PROCEDURE — 99213 OFFICE O/P EST LOW 20 MIN: CPT | Performed by: PHYSICIAN ASSISTANT

## 2024-12-12 RX ORDER — SULFAMETHOXAZOLE AND TRIMETHOPRIM 800; 160 MG/1; MG/1
1 TABLET ORAL 2 TIMES DAILY
Qty: 14 TABLET | Refills: 0 | Status: SHIPPED | OUTPATIENT
Start: 2024-12-12 | End: 2024-12-19

## 2024-12-12 ASSESSMENT — FIBROSIS 4 INDEX: FIB4 SCORE: 1.06

## 2024-12-13 DIAGNOSIS — R30.0 DYSURIA: ICD-10-CM

## 2024-12-13 ASSESSMENT — ENCOUNTER SYMPTOMS
FLANK PAIN: 0
CHILLS: 0
ABDOMINAL PAIN: 1
HEADACHES: 0
FEVER: 0
NAUSEA: 0
VOMITING: 0
BACK PAIN: 0

## 2024-12-13 NOTE — PROGRESS NOTES
"Subjective     Gabriel Duran is a 54 y.o. male who presents with Dysuria (Abdominal pain, penile discharge going on for \"a while\" but the intensity of it has started about 2 months ago)            Dysuria   This is a new problem. Episode onset: 2 months- worsening the past 2 weeks. Has BPH. Recent PSA was 17- he is waiting for urology. The problem occurs every urination. The problem has been unchanged. The quality of the pain is described as aching. There has been no fever. He is Not sexually active. There is No history of pyelonephritis. Associated symptoms include hesitancy and urgency. Pertinent negatives include no chills, discharge, flank pain, frequency, hematuria, nausea or vomiting. He has tried nothing for the symptoms.     History reviewed. No pertinent past medical history.    History reviewed. No pertinent surgical history.    Family History   Problem Relation Age of Onset    Hyperlipidemia Mother     Hypertension Mother     Heart Disease Mother     Diabetes Mother     Hypertension Maternal Grandmother     Heart Disease Maternal Grandmother     Diabetes Maternal Grandmother     Hypertension Maternal Grandfather      Allergies:  Sucralose    Medications, Allergies, and current problem list reviewed today in Epic      Review of Systems   Constitutional:  Negative for chills, fever and malaise/fatigue.   Gastrointestinal:  Positive for abdominal pain (mild pelvic pressure). Negative for nausea and vomiting.   Genitourinary:  Positive for dysuria, hesitancy and urgency. Negative for flank pain, frequency and hematuria.   Musculoskeletal:  Negative for back pain.   Neurological:  Negative for headaches.     All other systems reviewed and are negative.            Objective     BP 94/82 (BP Location: Left arm, Patient Position: Sitting, BP Cuff Size: Adult)   Pulse 99   Temp 36.8 °C (98.3 °F) (Temporal)   Resp 20   Ht 1.773 m (5' 9.8\")   Wt 65.4 kg (144 lb 1.6 oz)   SpO2 95%   BMI 20.79 kg/m²  "     Physical Exam  Constitutional:       General: He is not in acute distress.     Appearance: He is not ill-appearing.   HENT:      Head: Normocephalic and atraumatic.   Eyes:      Conjunctiva/sclera: Conjunctivae normal.   Cardiovascular:      Rate and Rhythm: Normal rate and regular rhythm.   Pulmonary:      Effort: Pulmonary effort is normal. No respiratory distress.      Breath sounds: No stridor. No wheezing.   Abdominal:      General: There is no distension.      Palpations: Abdomen is soft. There is no mass.      Tenderness: There is no abdominal tenderness. There is no right CVA tenderness, left CVA tenderness, guarding or rebound.   Skin:     General: Skin is warm and dry.   Neurological:      General: No focal deficit present.      Mental Status: He is alert and oriented to person, place, and time. Mental status is at baseline.   Psychiatric:         Mood and Affect: Mood normal.         Behavior: Behavior normal.         Thought Content: Thought content normal.         Judgment: Judgment normal.               Lab Results   Component Value Date/Time    POCCOLOR KATERYNA 12/12/2024 05:50 PM    POCAPPEAR TURBID 12/12/2024 05:50 PM    POCLEUKEST SMALL 12/12/2024 05:50 PM    POCNITRITE NEGATIVE 12/12/2024 05:50 PM    POCUROBILIGE 0.2 12/12/2024 05:50 PM    POCPROTEIN >=300 12/12/2024 05:50 PM    POCURPH 5.5 12/12/2024 05:50 PM    POCBLOOD LARGE 12/12/2024 05:50 PM    POCSPGRV >=1.030 12/12/2024 05:50 PM    POCKETONES NEGATIVE 12/12/2024 05:50 PM    POCBILIRUBIN NEGATIVE 12/12/2024 05:50 PM    POCGLUCUA NEGATIVE 12/12/2024 05:50 PM                      Assessment & Plan        Assessment & Plan  Dysuria  BPH- UA with possible signs of underlying infection  Orders:    POCT Urinalysis    URINE CULTURE(NEW); Future    sulfamethoxazole-trimethoprim (BACTRIM DS) 800-160 MG tablet; Take 1 Tablet by mouth 2 times a day for 7 days.          Differential diagnoses, Supportive care, and indications for immediate follow-up  discussed with patient.   Pathogenesis of diagnosis discussed including typical length and natural progression.   Instructed to return to clinic or nearest emergency department for any change in condition, further concerns, or worsening of symptoms.      The patient demonstrated a good understanding and agreed with the treatment plan.    Cait Wise P.A.-C.

## 2024-12-15 LAB
BACTERIA UR CULT: NORMAL
SIGNIFICANT IND 70042: NORMAL
SITE SITE: NORMAL
SOURCE SOURCE: NORMAL

## 2024-12-17 NOTE — PROGRESS NOTES
Subjective  Gabriel Duran is a 54 y.o. male who presents today for elevated PSA.  Only one PSA. Had negative Ucx at that time. Had blood on POC UA. Non-smoker. No family hx of prostate cancer. LUTS: nocturia x 2-3. On tamsulosin 0.8mg. Told him to take it in the PM not the AM.     Prostatic Specific Antigen Tot   Date Value Ref Range Status   12/11/2024 17.90 (H) 0.00 - 4.00 ng/mL Final             Family History   Problem Relation Age of Onset    Hyperlipidemia Mother     Hypertension Mother     Heart Disease Mother     Diabetes Mother     Hypertension Maternal Grandmother     Heart Disease Maternal Grandmother     Diabetes Maternal Grandmother     Hypertension Maternal Grandfather        Social History     Socioeconomic History    Marital status:    Tobacco Use    Smoking status: Every Day     Current packs/day: 0.10     Average packs/day: 0.1 packs/day for 37.0 years (3.7 ttl pk-yrs)     Types: Cigarettes     Start date: 1988    Smokeless tobacco: Never   Vaping Use    Vaping status: Never Used   Substance and Sexual Activity    Alcohol use: Not Currently    Drug use: Yes     Frequency: 7.0 times per week     Types: Marijuana    Sexual activity: Not Currently       No past surgical history on file.    No past medical history on file.    Current Outpatient Medications   Medication Sig    sulfamethoxazole-trimethoprim (BACTRIM DS) 800-160 MG tablet Take 1 Tablet by mouth 2 times a day for 7 days.    tamsulosin (FLOMAX) 0.4 MG capsule TAKE 1 CAPSULE BY MOUTH 1/2 HOUR AFTER BREAKFAST    albuterol 108 (90 Base) MCG/ACT Aero Soln inhalation aerosol Inhale 2 Puffs every 6 hours as needed for Shortness of Breath.    budesonide-formoterol (SYMBICORT) 80-4.5 MCG/ACT Aerosol Inhale 2 Puffs 2 times a day.       Allergies   Allergen Reactions    Sucralose        Objective  There were no vitals taken for this visit.  Physical Exam  Constitutional:       Appearance: Normal appearance. He is normal weight.    Genitourinary:     Penis: Normal.       Comments: CELY small, firm on left base and mid  Neurological:      Mental Status: He is alert.           Labs:   CBC   Lab Results   Component Value Date/Time    WBC 8.6 12/11/2024 1436    RBC 4.94 12/11/2024 1436    HEMOGLOBIN 14.6 12/11/2024 1436    HEMATOCRIT 45.8 12/11/2024 1436    MCV 92.7 12/11/2024 1436    MCH 29.6 12/11/2024 1436    MCHC 31.9 (L) 12/11/2024 1436    RDW 43.8 12/11/2024 1436    MPV 8.9 (L) 12/11/2024 1436       BMP   Lab Results   Component Value Date/Time    SODIUM 139 12/02/2023 0911    POTASSIUM 4.2 12/02/2023 0911    CHLORIDE 104 12/02/2023 0911    CO2 24 12/02/2023 0911    GLUCOSE 86 12/02/2023 0911    BUN 21 12/02/2023 0911    CREATININE 0.85 12/02/2023 0911    CALCIUM 9.6 12/02/2023 0911     PSA   Lab Results   Component Value Date/Time    PSATOTAL 17.90 (H) 12/11/2024 1436       Imaging:   None relevant        Assessment & Plan    We discussed today the nature and history of PSA testing for prostate cancer screening. I explained that PSA elevation can be one of the earliest indicators of a risk of prostate cancer, but that PSA elevation can also result from numerous benign causes including urinary tract infection, chronic prostatitis, benign prostatic hyperplasia, urinary retention or elevated post-void residual volumes, mild prostatic trauma from activities requiring a saddle-type seat, and even having had a blood sample taken for PSA after a digital rectal exam or after recent sexual activity. For these reasons, I generally advise repeating a PSA after an initial elevation before proceeding with invasive testing such as a prostate biopsy.     We also discussed in full detail the concept of shared decision making and I explained updated AUA guidelines on prostate cancer screening and early detection. We incorporated patient age and overall health in this discussion, as well family history. Finally, we discussed the natural history of prostate  cancer and some of the potential risks associated with both prostate biopsy and treatment of localized prostate cancer.     Will repeat PSA next week and if elevated proceed to MRI. No contraindications to MRI.     LUTS: Will take tamsulosin 0.8 mg after dinner.     I spent 30 minutes on chart review and care plan.     Gagan Leroy M.D., F.A.C.S.  Urologic Oncology  Vice-Chair, Department of Surgery  Replaced by [facility]

## 2025-01-02 ENCOUNTER — OFFICE VISIT (OUTPATIENT)
Dept: UROLOGY | Facility: MEDICAL CENTER | Age: 55
End: 2025-01-02
Payer: MEDICAID

## 2025-01-02 DIAGNOSIS — R97.20 ELEVATED PSA: ICD-10-CM

## 2025-01-02 PROCEDURE — 99203 OFFICE O/P NEW LOW 30 MIN: CPT | Performed by: UROLOGY

## 2025-01-07 ENCOUNTER — HOSPITAL ENCOUNTER (OUTPATIENT)
Dept: LAB | Facility: MEDICAL CENTER | Age: 55
End: 2025-01-07
Attending: UROLOGY
Payer: MEDICAID

## 2025-01-07 DIAGNOSIS — R97.20 ELEVATED PSA: ICD-10-CM

## 2025-01-07 PROCEDURE — 84153 ASSAY OF PSA TOTAL: CPT

## 2025-01-07 PROCEDURE — 36415 COLL VENOUS BLD VENIPUNCTURE: CPT

## 2025-01-08 DIAGNOSIS — R97.20 ELEVATED PSA: ICD-10-CM

## 2025-01-08 LAB — PSA SERPL DL<=0.01 NG/ML-MCNC: 18.6 NG/ML (ref 0–4)

## 2025-01-09 ENCOUNTER — HOSPITAL ENCOUNTER (OUTPATIENT)
Dept: RADIOLOGY | Facility: MEDICAL CENTER | Age: 55
End: 2025-01-09
Attending: UROLOGY
Payer: MEDICAID

## 2025-01-09 DIAGNOSIS — R97.20 ELEVATED PSA: ICD-10-CM

## 2025-01-09 PROCEDURE — A9579 GAD-BASE MR CONTRAST NOS,1ML: HCPCS | Mod: JZ,UD | Performed by: UROLOGY

## 2025-01-09 PROCEDURE — 72197 MRI PELVIS W/O & W/DYE: CPT

## 2025-01-09 PROCEDURE — 700117 HCHG RX CONTRAST REV CODE 255: Mod: JZ,UD | Performed by: UROLOGY

## 2025-01-09 PROCEDURE — 700111 HCHG RX REV CODE 636 W/ 250 OVERRIDE (IP): Mod: JZ,UD | Performed by: RADIOLOGY

## 2025-01-09 RX ADMIN — GADOTERIDOL 13 ML: 279.3 INJECTION, SOLUTION INTRAVENOUS at 16:10

## 2025-01-09 RX ADMIN — GLUCAGON 1 MG: 1 INJECTION, POWDER, LYOPHILIZED, FOR SOLUTION INTRAMUSCULAR; INTRAVENOUS at 15:23

## 2025-01-10 DIAGNOSIS — R97.20 ELEVATED PSA: ICD-10-CM

## 2025-01-10 RX ORDER — CIPROFLOXACIN 500 MG/1
500 TABLET, FILM COATED ORAL
Qty: 1 TABLET | Refills: 0 | Status: SHIPPED | OUTPATIENT
Start: 2025-01-10 | End: 2025-01-10

## 2025-01-13 ENCOUNTER — TELEPHONE (OUTPATIENT)
Dept: UROLOGY | Facility: MEDICAL CENTER | Age: 55
End: 2025-01-13
Payer: MEDICAID

## 2025-01-13 NOTE — TELEPHONE ENCOUNTER
I called patient and explained need for cysto to evaluate bladder mass then prostate biopsy. Described the procedure and risks and he agrees to proceed.     Gagan Leroy M.D., F.A.C.S.  Urologic Oncology  Vice-Chair, Department of Surgery  [facility]

## 2025-01-15 ENCOUNTER — TELEPHONE (OUTPATIENT)
Dept: UROLOGY | Facility: MEDICAL CENTER | Age: 55
End: 2025-01-15
Payer: MEDICAID

## 2025-01-21 ENCOUNTER — TELEPHONE (OUTPATIENT)
Dept: UROLOGY | Facility: MEDICAL CENTER | Age: 55
End: 2025-01-21
Payer: MEDICAID

## 2025-01-23 ENCOUNTER — TELEPHONE (OUTPATIENT)
Dept: UROLOGY | Facility: MEDICAL CENTER | Age: 55
End: 2025-01-23
Payer: MEDICAID

## 2025-03-10 ENCOUNTER — TELEPHONE (OUTPATIENT)
Dept: UROLOGY | Facility: MEDICAL CENTER | Age: 55
End: 2025-03-10
Payer: MEDICAID

## 2025-03-10 ENCOUNTER — TELEPHONE (OUTPATIENT)
Dept: UROLOGY | Facility: MEDICAL CENTER | Age: 55
End: 2025-03-10

## 2025-03-11 ENCOUNTER — TELEPHONE (OUTPATIENT)
Dept: UROLOGY | Facility: MEDICAL CENTER | Age: 55
End: 2025-03-11
Payer: MEDICAID

## 2025-03-11 NOTE — TELEPHONE ENCOUNTER
----- Message from SHERRY ARRIETA sent at 3/10/2025  1:29 PM PDT -----  Regarding: prostate biopsy / cysto  4/1 @ 2:00w/ Rad - prostate biopsy / cysto  
Juan BERRY  
Male

## 2025-03-17 DIAGNOSIS — R97.20 ELEVATED PSA: ICD-10-CM

## 2025-03-17 RX ORDER — CIPROFLOXACIN 500 MG/1
500 TABLET, FILM COATED ORAL
Qty: 1 TABLET | Refills: 0 | Status: SHIPPED | OUTPATIENT
Start: 2025-03-17 | End: 2025-03-17

## 2025-03-31 NOTE — PROGRESS NOTES
Subjective  Gabriel Duran is a 54 y.o. male who presents today for cystoscopy to evaluate  bladder mass .     Family History   Problem Relation Age of Onset    Hyperlipidemia Mother     Hypertension Mother     Heart Disease Mother     Diabetes Mother     Hypertension Maternal Grandmother     Heart Disease Maternal Grandmother     Diabetes Maternal Grandmother     Hypertension Maternal Grandfather        Social History     Socioeconomic History    Marital status:    Tobacco Use    Smoking status: Every Day     Current packs/day: 0.10     Average packs/day: 0.1 packs/day for 37.2 years (3.7 ttl pk-yrs)     Types: Cigarettes     Start date: 1988    Smokeless tobacco: Never   Vaping Use    Vaping status: Never Used   Substance and Sexual Activity    Alcohol use: Not Currently    Drug use: Yes     Frequency: 7.0 times per week     Types: Marijuana    Sexual activity: Not Currently       No past surgical history on file.    No past medical history on file.    Current Outpatient Medications   Medication Sig    tamsulosin (FLOMAX) 0.4 MG capsule TAKE 1 CAPSULE BY MOUTH 1/2 HOUR AFTER BREAKFAST    albuterol 108 (90 Base) MCG/ACT Aero Soln inhalation aerosol Inhale 2 Puffs every 6 hours as needed for Shortness of Breath.    budesonide-formoterol (SYMBICORT) 80-4.5 MCG/ACT Aerosol Inhale 2 Puffs 2 times a day.       Allergies   Allergen Reactions    Sucralose        Objective  There were no vitals taken for this visit.  Physical Exam      Labs:     POC UA  Lab Results   Component Value Date/Time    POCCOLOR KATERYNA 12/12/2024 05:50 PM    POCAPPEAR TURBID 12/12/2024 05:50 PM    POCLEUKEST SMALL 12/12/2024 05:50 PM    POCNITRITE NEGATIVE 12/12/2024 05:50 PM    POCUROBILIGE 0.2 12/12/2024 05:50 PM    POCPROTEIN >=300 12/12/2024 05:50 PM    POCURPH 5.5 12/12/2024 05:50 PM    POCBLOOD LARGE 12/12/2024 05:50 PM    POCSPGRV >=1.030 12/12/2024 05:50 PM    POCKETONES NEGATIVE 12/12/2024 05:50 PM    POCBILIRUBIN NEGATIVE  12/12/2024 05:50 PM    POCGLUCUA NEGATIVE 12/12/2024 05:50 PM        CMP   Lab Results   Component Value Date/Time    SODIUM 139 12/02/2023 0911    POTASSIUM 4.2 12/02/2023 0911    CHLORIDE 104 12/02/2023 0911    CO2 24 12/02/2023 0911    ANION 11.0 12/02/2023 0911    GLUCOSE 86 12/02/2023 0911    BUN 21 12/02/2023 0911    CREATININE 0.85 12/02/2023 0911    GFRCKD 102 12/02/2023 0911    CALCIUM 9.6 12/02/2023 0911    CORRCALC 9.5 12/02/2023 0911    ASTSGOT 40 12/02/2023 0911    ALTSGPT 50 12/02/2023 0911    ALKPHOSPHAT 120 (H) 12/02/2023 0911    TBILIRUBIN 0.3 12/02/2023 0911    ALBUMIN 4.1 12/02/2023 0911    TOTPROTEIN 7.4 12/02/2023 0911    GLOBULIN 3.3 12/02/2023 0911    AGRATIO 1.2 12/02/2023 0911       BMP  Lab Results   Component Value Date/Time    SODIUM 139 12/02/2023 0911    POTASSIUM 4.2 12/02/2023 0911    CHLORIDE 104 12/02/2023 0911    CO2 24 12/02/2023 0911    GLUCOSE 86 12/02/2023 0911    BUN 21 12/02/2023 0911    CREATININE 0.85 12/02/2023 0911    CALCIUM 9.6 12/02/2023 0911       CBC  Lab Results   Component Value Date/Time    WBC 8.6 12/11/2024 1436    RBC 4.94 12/11/2024 1436    HEMOGLOBIN 14.6 12/11/2024 1436    HEMATOCRIT 45.8 12/11/2024 1436    MCV 92.7 12/11/2024 1436    MCH 29.6 12/11/2024 1436    MCHC 31.9 (L) 12/11/2024 1436    RDW 43.8 12/11/2024 1436    MPV 8.9 (L) 12/11/2024 1436       A1C  Lab Results   Component Value Date/Time    HBA1C 5.8 (H) 12/02/2023 0911    AVGLUC 120 12/02/2023 0911       ***    Imaging:         Procedure    Procedure performed: Cystoscopy       Surgeon: Dr. Jason Leroy    Indications For Procedure:  bladder mass on MRI    Blood Loss: *** cc     Anesthesia: Lidocaine jelly     Specimen: ***     Findings:     1. Urethra: {male urethra findings:14928}    2. Bladder: {bladder findings:49712}    3. Bilateral ureteral jets observed with {ureteral efflux:11955} efflux ***     4. Prostate: *** lateral lobe hypertrophy, *** median lobe, *** cm prostate length      Description of Procedure: The patient was prepped and draped in the usual sterile fashion.  A 17Fr flexible cystoscope was advanced along the urethra into the bladder under direct vision.  We performed a thorough cystoscopic evaluation patient's bladder including retroflexion, findings noted above.  We removed the cystoscope under direct vision to evaluate the urethra and prostate, findings noted above.  This concluded the procedure, the patient tolerated it well.     Assessment  Patient was instructed to call our office if he develops any signs of infection including increased frequency, urgency, dysuria, fever, or chills.  We discussed the results of the cystoscopy with the patient, all questions and concerns addressed. ***    Plan    There are no diagnoses linked to this encounter.    No follow-ups on file.

## 2025-03-31 NOTE — PROGRESS NOTES
Subjective  Gabriel Duran is a 54 y.o. male who presents today for TRUS and prostate biopsy. PSA 17 and on repeat 18.   MRI:  1/9/2025 3:08 PM     HISTORY/REASON FOR EXAM:  55yo with PSA 18.6     TECHNIQUE/EXAM DESCRIPTION:  MRI of the prostate without and with contrast.     The study was performed on a Phyllis 3.0 [place] MRI scanner.     Sagittal and axial T2; oblique high resolution axial and coronal T2; oblique axial diffusion-weighted imaging and ADC map with B values of 100, 400, and 1000; oblique axial dynamic postcontrast images of the prostate; postcontrast T1 fat-sat sequence of   the pelvis.     1 mg of glucagon was administered intravenously prior to the exam.     13 mL ProHance contrast was administered intravenously.     COMPARISON: None.     FINDINGS:  The prostate measures 4.4 x 3.8 x 4.8 cm with a volume of 48 cc.     Postsurgical change in the midline prostate.     Peripheral zone: No hemorrhage. There is an 2.1 cm ill-defined area of moderate T2 hypointensity in the left peripheral zone mid gland restricted diffusion.     Transitional zone: Postsurgical change in the transitional zone. There is extensive enhancing soft tissue mass extending from the surgical bed to the urinary bladder. The mass tracts along the bladder wall.     , Nonspecific Prostate capsule: Intact.  Seminal vesicles: Normal high signal.     The bladder is normal in appearance.     No definite pelvic adenopathy is identified.     No definite suspicious pelvic bony lesions are identified.     Diffuse wall thickening of the rectum.     IMPRESSION:        1. There is an 2.1 cm ill-defined area of moderate T2 hypointensity in the left peripheral zone mid gland restricted diffusion, consistent with prostate cancer.  2. Post-surgical changes in the transitional zone are noted from the prior TURP procedure. An extensive enhancing soft tissue mass extends from the surgical bed to the urinary bladder, with the mass tracking along the  bladder wall. These findings are   concerning for prostate cancer. Bladder cancer extending to the prostate is a differential diagnosis, and cystoscopy may be helpful.  3. Diffuse wall thickening of the rectum.     Total PI-RADS score: 5 - Very high (clinically significant cancer is highly likely to be present)       Pre-procedure antibiotics were confirmed, and he has completed his bowel prep.    Family History   Problem Relation Age of Onset    Hyperlipidemia Mother     Hypertension Mother     Heart Disease Mother     Diabetes Mother     Hypertension Maternal Grandmother     Heart Disease Maternal Grandmother     Diabetes Maternal Grandmother     Hypertension Maternal Grandfather        Social History     Socioeconomic History    Marital status:    Tobacco Use    Smoking status: Every Day     Current packs/day: 0.10     Average packs/day: 0.1 packs/day for 37.2 years (3.7 ttl pk-yrs)     Types: Cigarettes     Start date: 1988    Smokeless tobacco: Never   Vaping Use    Vaping status: Never Used   Substance and Sexual Activity    Alcohol use: Not Currently    Drug use: Yes     Frequency: 7.0 times per week     Types: Marijuana    Sexual activity: Not Currently       No past surgical history on file.    No past medical history on file.    Current Outpatient Medications   Medication Sig    tamsulosin (FLOMAX) 0.4 MG capsule TAKE 1 CAPSULE BY MOUTH 1/2 HOUR AFTER BREAKFAST    albuterol 108 (90 Base) MCG/ACT Aero Soln inhalation aerosol Inhale 2 Puffs every 6 hours as needed for Shortness of Breath.    budesonide-formoterol (SYMBICORT) 80-4.5 MCG/ACT Aerosol Inhale 2 Puffs 2 times a day.       Allergies   Allergen Reactions    Sucralose        Objective  There were no vitals taken for this visit.  Physical Exam  ***    Labs:     POC UA  Lab Results   Component Value Date/Time    POCCOLOR KATERYNA 12/12/2024 05:50 PM    POCAPPEAR TURBID 12/12/2024 05:50 PM    POCLEUKEST SMALL 12/12/2024 05:50 PM    POCNITRITE  NEGATIVE 12/12/2024 05:50 PM    POCUROBILIGE 0.2 12/12/2024 05:50 PM    POCPROTEIN >=300 12/12/2024 05:50 PM    POCURPH 5.5 12/12/2024 05:50 PM    POCBLOOD LARGE 12/12/2024 05:50 PM    POCSPGRV >=1.030 12/12/2024 05:50 PM    POCKETONES NEGATIVE 12/12/2024 05:50 PM    POCBILIRUBIN NEGATIVE 12/12/2024 05:50 PM    POCGLUCUA NEGATIVE 12/12/2024 05:50 PM        CMP  Lab Results   Component Value Date/Time    SODIUM 139 12/02/2023 0911    POTASSIUM 4.2 12/02/2023 0911    CHLORIDE 104 12/02/2023 0911    CO2 24 12/02/2023 0911    ANION 11.0 12/02/2023 0911    GLUCOSE 86 12/02/2023 0911    BUN 21 12/02/2023 0911    CREATININE 0.85 12/02/2023 0911    GFRCKD 102 12/02/2023 0911    CALCIUM 9.6 12/02/2023 0911    CORRCALC 9.5 12/02/2023 0911    ASTSGOT 40 12/02/2023 0911    ALTSGPT 50 12/02/2023 0911    ALKPHOSPHAT 120 (H) 12/02/2023 0911    TBILIRUBIN 0.3 12/02/2023 0911    ALBUMIN 4.1 12/02/2023 0911    TOTPROTEIN 7.4 12/02/2023 0911    GLOBULIN 3.3 12/02/2023 0911    AGRATIO 1.2 12/02/2023 0911       BMP  Lab Results   Component Value Date/Time    SODIUM 139 12/02/2023 0911    POTASSIUM 4.2 12/02/2023 0911    CHLORIDE 104 12/02/2023 0911    CO2 24 12/02/2023 0911    GLUCOSE 86 12/02/2023 0911    BUN 21 12/02/2023 0911    CREATININE 0.85 12/02/2023 0911    CALCIUM 9.6 12/02/2023 0911       CBC  Lab Results   Component Value Date/Time    WBC 8.6 12/11/2024 1436    RBC 4.94 12/11/2024 1436    HEMOGLOBIN 14.6 12/11/2024 1436    HEMATOCRIT 45.8 12/11/2024 1436    MCV 92.7 12/11/2024 1436    MCH 29.6 12/11/2024 1436    MCHC 31.9 (L) 12/11/2024 1436    RDW 43.8 12/11/2024 1436    MPV 8.9 (L) 12/11/2024 1436       A1C  Lab Results   Component Value Date/Time    HBA1C 5.8 (H) 12/02/2023 0911    AVGLUC 120 12/02/2023 0911       ***    Imaging:     ***    Procedure    Procedure performed: Transrectal ultrasound with prostate biopsy     Anesthesia: 1% lidocaine plain 10 cc     Indications for procedure: ***     Prostate volume on TRUS:  *** g, *** median lobe     Specimen:     A) left lateral base  B) left medial base  C) left lateral mid  D) left medial mid  E) left apex  F) left anterior  G) right lateral base  H) right medial base  I) right lateral mid  J) right medial mid  K) right apex  L) right anterior  M) MRI [facility]      ***    Description of procedure: The patient was placed on the table in the left lateral decubitus position.  Pre-procedure bowel prep was confirmed, pre-procedure antibiotics were confirmed.  The transrectal ultrasound was then passed into the rectum.  The prostate was then evaluated and measured. 10 cc of 1% lidocaine plain was injected bilaterally; 2cc in each base and mid and 1cc in each apex  for local anesthesia.    We proceeded to perform the systematic prostate biopsy taking a total of {TRUSBxSamples:94245} samples, specimens listed above.  The probe was left in place for an additional minute for pressure to address any bleeding.  The prostate was scanned with no signs of active bleeding.  The probe was removed, this concluded the procedure, the patient tolerated it well.     Assessment  We will arrange for follow-up in 2 weeks to discuss the results of the prostate biopsy.  The patient was instructed to go to the emergency department if he develops any signs of systemic infection including fevers, chills, and dysuria.  He was advised of the risk of possible urinary retention after prostate biopsy and to call our office or present to the emergency department if he is unable to urinate for greater than 6 hours.  We discussed that it is normal to see  red blood or dark red colored blood in the urine or the stool for 1 to 2 weeks after the prostate biopsy and that this should get progressively lighter.  It is also normal to see blood in the semen for up to 6 weeks after prostate biopsy.  He was instructed to give us a call if any of these worsen or do not resolve.     Plan    There are no diagnoses linked to this  encounter.    No follow-ups on file.

## 2025-04-01 ENCOUNTER — APPOINTMENT (OUTPATIENT)
Dept: UROLOGY | Facility: MEDICAL CENTER | Age: 55
End: 2025-04-01
Payer: MEDICAID

## 2025-04-01 ENCOUNTER — HOSPITAL ENCOUNTER (OUTPATIENT)
Facility: MEDICAL CENTER | Age: 55
End: 2025-04-01
Attending: UROLOGY
Payer: MEDICAID

## 2025-04-01 DIAGNOSIS — R97.20 ELEVATED PSA: ICD-10-CM

## 2025-04-01 LAB
APPEARANCE UR: NORMAL
BILIRUB UR STRIP-MCNC: NORMAL MG/DL
COLOR UR AUTO: NORMAL
GLUCOSE UR STRIP.AUTO-MCNC: NORMAL MG/DL
KETONES UR STRIP.AUTO-MCNC: NORMAL MG/DL
LEUKOCYTE ESTERASE UR QL STRIP.AUTO: NORMAL
NITRITE UR QL STRIP.AUTO: NORMAL
PATHOLOGY CONSULT NOTE: NORMAL
PH UR STRIP.AUTO: 7 [PH] (ref 5–8)
PROT UR QL STRIP: 100 MG/DL
RBC UR QL AUTO: NORMAL
SP GR UR STRIP.AUTO: 1.02
UROBILINOGEN UR STRIP-MCNC: 0.2 MG/DL

## 2025-04-01 PROCEDURE — G0416 PROSTATE BIOPSY, ANY MTHD: HCPCS | Performed by: PATHOLOGY

## 2025-04-01 PROCEDURE — 55700 PR BIOPSY OF PROSTATE,NEEDLE/PUNCH: CPT | Performed by: UROLOGY

## 2025-04-01 PROCEDURE — 88305 TISSUE EXAM BY PATHOLOGIST: CPT | Mod: 59 | Performed by: PATHOLOGY

## 2025-04-01 PROCEDURE — 76872 US TRANSRECTAL: CPT | Performed by: UROLOGY

## 2025-04-01 PROCEDURE — 81002 URINALYSIS NONAUTO W/O SCOPE: CPT | Performed by: UROLOGY

## 2025-04-01 PROCEDURE — 52000 CYSTOURETHROSCOPY: CPT | Performed by: UROLOGY

## 2025-04-01 RX ADMIN — Medication 10 ML: at 15:28

## 2025-04-07 ENCOUNTER — APPOINTMENT (OUTPATIENT)
Dept: ADMISSIONS | Facility: MEDICAL CENTER | Age: 55
End: 2025-04-07
Attending: UROLOGY
Payer: MEDICAID

## 2025-04-07 ENCOUNTER — TELEPHONE (OUTPATIENT)
Dept: UROLOGY | Facility: MEDICAL CENTER | Age: 55
End: 2025-04-07
Payer: MEDICAID

## 2025-04-07 DIAGNOSIS — C61 PROSTATE CANCER (HCC): ICD-10-CM

## 2025-04-07 NOTE — TELEPHONE ENCOUNTER
I called the patient and reviewed his prostate biopsy findings.  He has grade group 2 and 3 prostate cancer.  I ordered a PSMA PET scan for staging.  We will need to resect his bladder tumor first and look at the pathology of that.  Once we have both pieces of information we will come up with a treatment plan.  He agrees with that.    Gagan Leroy M.D., F.A.C.S.  Urologic Oncology  Vice-Chair, Department of Surgery  [facility]

## 2025-04-09 ENCOUNTER — PRE-ADMISSION TESTING (OUTPATIENT)
Dept: ADMISSIONS | Facility: MEDICAL CENTER | Age: 55
End: 2025-04-09
Attending: UROLOGY
Payer: MEDICAID

## 2025-04-09 NOTE — PREADMIT AVS NOTE
Current Medications   Medication Instructions    tamsulosin (FLOMAX) 0.4 MG capsule Continue as prescribed    albuterol 108 (90 Base) MCG/ACT Aero Soln inhalation aerosol Continue as prescribed    budesonide-formoterol (SYMBICORT) 80-4.5 MCG/ACT Aerosol Continue as prescribed

## 2025-04-10 ENCOUNTER — PRE-ADMISSION TESTING (OUTPATIENT)
Dept: ADMISSIONS | Facility: MEDICAL CENTER | Age: 55
End: 2025-04-10
Attending: UROLOGY
Payer: MEDICAID

## 2025-04-10 DIAGNOSIS — Z01.810 PRE-OPERATIVE CARDIOVASCULAR EXAMINATION: ICD-10-CM

## 2025-04-10 DIAGNOSIS — Z01.812 PRE-OPERATIVE LABORATORY EXAMINATION: ICD-10-CM

## 2025-04-10 LAB
ANION GAP SERPL CALC-SCNC: 11 MMOL/L (ref 7–16)
APPEARANCE UR: ABNORMAL
BACTERIA #/AREA URNS HPF: ABNORMAL /HPF
BASOPHILS # BLD AUTO: 0.3 % (ref 0–1.8)
BASOPHILS # BLD: 0.02 K/UL (ref 0–0.12)
BILIRUB UR QL STRIP.AUTO: NEGATIVE
BUN SERPL-MCNC: 17 MG/DL (ref 8–22)
CALCIUM SERPL-MCNC: 9.5 MG/DL (ref 8.5–10.5)
CASTS URNS QL MICRO: ABNORMAL /LPF (ref 0–2)
CHLORIDE SERPL-SCNC: 103 MMOL/L (ref 96–112)
CO2 SERPL-SCNC: 23 MMOL/L (ref 20–33)
COLOR UR: YELLOW
CREAT SERPL-MCNC: 1.02 MG/DL (ref 0.5–1.4)
EKG IMPRESSION: NORMAL
EOSINOPHIL # BLD AUTO: 0.14 K/UL (ref 0–0.51)
EOSINOPHIL NFR BLD: 1.8 % (ref 0–6.9)
EPITHELIAL CELLS 1715: ABNORMAL /HPF (ref 0–5)
ERYTHROCYTE [DISTWIDTH] IN BLOOD BY AUTOMATED COUNT: 45.7 FL (ref 35.9–50)
EST. AVERAGE GLUCOSE BLD GHB EST-MCNC: 126 MG/DL
GFR SERPLBLD CREATININE-BSD FMLA CKD-EPI: 86 ML/MIN/1.73 M 2
GLUCOSE SERPL-MCNC: 88 MG/DL (ref 65–99)
GLUCOSE UR STRIP.AUTO-MCNC: NEGATIVE MG/DL
HBA1C MFR BLD: 6 % (ref 4–5.6)
HCT VFR BLD AUTO: 45.6 % (ref 42–52)
HGB BLD-MCNC: 15.1 G/DL (ref 14–18)
IMM GRANULOCYTES # BLD AUTO: 0.02 K/UL (ref 0–0.11)
IMM GRANULOCYTES NFR BLD AUTO: 0.3 % (ref 0–0.9)
KETONES UR STRIP.AUTO-MCNC: ABNORMAL MG/DL
LEUKOCYTE ESTERASE UR QL STRIP.AUTO: ABNORMAL
LYMPHOCYTES # BLD AUTO: 1.55 K/UL (ref 1–4.8)
LYMPHOCYTES NFR BLD: 19.6 % (ref 22–41)
MCH RBC QN AUTO: 29.5 PG (ref 27–33)
MCHC RBC AUTO-ENTMCNC: 33.1 G/DL (ref 32.3–36.5)
MCV RBC AUTO: 89.1 FL (ref 81.4–97.8)
MICRO URNS: ABNORMAL
MONOCYTES # BLD AUTO: 0.76 K/UL (ref 0–0.85)
MONOCYTES NFR BLD AUTO: 9.6 % (ref 0–13.4)
NEUTROPHILS # BLD AUTO: 5.43 K/UL (ref 1.82–7.42)
NEUTROPHILS NFR BLD: 68.4 % (ref 44–72)
NITRITE UR QL STRIP.AUTO: NEGATIVE
NRBC # BLD AUTO: 0 K/UL
NRBC BLD-RTO: 0 /100 WBC (ref 0–0.2)
PH UR STRIP.AUTO: 6 [PH] (ref 5–8)
PLATELET # BLD AUTO: 300 K/UL (ref 164–446)
PMV BLD AUTO: 8.6 FL (ref 9–12.9)
POTASSIUM SERPL-SCNC: 3.9 MMOL/L (ref 3.6–5.5)
PROT UR QL STRIP: 100 MG/DL
RBC # BLD AUTO: 5.12 M/UL (ref 4.7–6.1)
RBC # URNS HPF: ABNORMAL /HPF (ref 0–2)
RBC UR QL AUTO: ABNORMAL
SODIUM SERPL-SCNC: 137 MMOL/L (ref 135–145)
SP GR UR STRIP.AUTO: 1.02
UROBILINOGEN UR STRIP.AUTO-MCNC: 1 EU/DL
WBC # BLD AUTO: 7.9 K/UL (ref 4.8–10.8)
WBC #/AREA URNS HPF: ABNORMAL /HPF

## 2025-04-10 PROCEDURE — 93005 ELECTROCARDIOGRAM TRACING: CPT | Mod: TC

## 2025-04-10 PROCEDURE — 81001 URINALYSIS AUTO W/SCOPE: CPT

## 2025-04-10 PROCEDURE — 83036 HEMOGLOBIN GLYCOSYLATED A1C: CPT

## 2025-04-10 PROCEDURE — 93010 ELECTROCARDIOGRAM REPORT: CPT | Performed by: STUDENT IN AN ORGANIZED HEALTH CARE EDUCATION/TRAINING PROGRAM

## 2025-04-10 PROCEDURE — 85025 COMPLETE CBC W/AUTO DIFF WBC: CPT

## 2025-04-10 PROCEDURE — 36415 COLL VENOUS BLD VENIPUNCTURE: CPT

## 2025-04-10 PROCEDURE — 80048 BASIC METABOLIC PNL TOTAL CA: CPT

## 2025-04-11 ENCOUNTER — HOSPITAL ENCOUNTER (OUTPATIENT)
Dept: RADIOLOGY | Facility: MEDICAL CENTER | Age: 55
End: 2025-04-11
Attending: UROLOGY
Payer: MEDICAID

## 2025-04-11 ENCOUNTER — TELEPHONE (OUTPATIENT)
Dept: UROLOGY | Facility: MEDICAL CENTER | Age: 55
End: 2025-04-11
Payer: MEDICAID

## 2025-04-11 ENCOUNTER — RESULTS FOLLOW-UP (OUTPATIENT)
Dept: UROLOGY | Facility: MEDICAL CENTER | Age: 55
End: 2025-04-11

## 2025-04-11 DIAGNOSIS — C61 PROSTATE CANCER (HCC): ICD-10-CM

## 2025-04-11 PROCEDURE — A9595 CT-PETCT-PROSTATE SCAN SKULL BASE TO MID-THIGH (PYLARIFY, POSLUMA, OR AXUMIN): HCPCS

## 2025-04-14 ENCOUNTER — ANESTHESIA (OUTPATIENT)
Dept: SURGERY | Facility: MEDICAL CENTER | Age: 55
End: 2025-04-14
Payer: MEDICAID

## 2025-04-14 ENCOUNTER — ANESTHESIA EVENT (OUTPATIENT)
Dept: SURGERY | Facility: MEDICAL CENTER | Age: 55
End: 2025-04-14
Payer: MEDICAID

## 2025-04-14 ENCOUNTER — HOSPITAL ENCOUNTER (OUTPATIENT)
Facility: MEDICAL CENTER | Age: 55
End: 2025-04-15
Attending: UROLOGY | Admitting: UROLOGY
Payer: MEDICAID

## 2025-04-14 DIAGNOSIS — C67.8 MALIGNANT NEOPLASM OF OVERLAPPING SITES OF BLADDER (HCC): ICD-10-CM

## 2025-04-14 PROBLEM — C67.9 BLADDER CANCER (HCC): Status: ACTIVE | Noted: 2025-04-14

## 2025-04-14 LAB
ANION GAP SERPL CALC-SCNC: 11 MMOL/L (ref 7–16)
BUN SERPL-MCNC: 15 MG/DL (ref 8–22)
CALCIUM SERPL-MCNC: 8.9 MG/DL (ref 8.5–10.5)
CHLORIDE SERPL-SCNC: 104 MMOL/L (ref 96–112)
CO2 SERPL-SCNC: 27 MMOL/L (ref 20–33)
CREAT SERPL-MCNC: 1.07 MG/DL (ref 0.5–1.4)
ERYTHROCYTE [DISTWIDTH] IN BLOOD BY AUTOMATED COUNT: 45.9 FL (ref 35.9–50)
GFR SERPLBLD CREATININE-BSD FMLA CKD-EPI: 81 ML/MIN/1.73 M 2
GLUCOSE SERPL-MCNC: 115 MG/DL (ref 65–99)
HCT VFR BLD AUTO: 40.2 % (ref 42–52)
HGB BLD-MCNC: 13.4 G/DL (ref 14–18)
MCH RBC QN AUTO: 30.4 PG (ref 27–33)
MCHC RBC AUTO-ENTMCNC: 33.3 G/DL (ref 32.3–36.5)
MCV RBC AUTO: 91.2 FL (ref 81.4–97.8)
PATHOLOGY CONSULT NOTE: NORMAL
PLATELET # BLD AUTO: 233 K/UL (ref 164–446)
PMV BLD AUTO: 8.7 FL (ref 9–12.9)
POTASSIUM SERPL-SCNC: 4 MMOL/L (ref 3.6–5.5)
RBC # BLD AUTO: 4.41 M/UL (ref 4.7–6.1)
SODIUM SERPL-SCNC: 142 MMOL/L (ref 135–145)
WBC # BLD AUTO: 9 K/UL (ref 4.8–10.8)

## 2025-04-14 PROCEDURE — 770001 HCHG ROOM/CARE - MED/SURG/GYN PRIV*

## 2025-04-14 PROCEDURE — 700102 HCHG RX REV CODE 250 W/ 637 OVERRIDE(OP): Mod: UD | Performed by: UROLOGY

## 2025-04-14 PROCEDURE — A9270 NON-COVERED ITEM OR SERVICE: HCPCS | Mod: UD | Performed by: UROLOGY

## 2025-04-14 PROCEDURE — 160009 HCHG ANES TIME/MIN: Performed by: UROLOGY

## 2025-04-14 PROCEDURE — 160015 HCHG STAT PREOP MINUTES: Performed by: UROLOGY

## 2025-04-14 PROCEDURE — 160028 HCHG SURGERY MINUTES - 1ST 30 MINS LEVEL 3: Performed by: UROLOGY

## 2025-04-14 PROCEDURE — 160002 HCHG RECOVERY MINUTES (STAT): Performed by: UROLOGY

## 2025-04-14 PROCEDURE — 700102 HCHG RX REV CODE 250 W/ 637 OVERRIDE(OP): Performed by: ANESTHESIOLOGY

## 2025-04-14 PROCEDURE — A9270 NON-COVERED ITEM OR SERVICE: HCPCS | Performed by: ANESTHESIOLOGY

## 2025-04-14 PROCEDURE — 700105 HCHG RX REV CODE 258: Mod: UD | Performed by: UROLOGY

## 2025-04-14 PROCEDURE — 88307 TISSUE EXAM BY PATHOLOGIST: CPT | Mod: 26 | Performed by: PATHOLOGY

## 2025-04-14 PROCEDURE — 160048 HCHG OR STATISTICAL LEVEL 1-5: Performed by: UROLOGY

## 2025-04-14 PROCEDURE — 160035 HCHG PACU - 1ST 60 MINS PHASE I: Performed by: UROLOGY

## 2025-04-14 PROCEDURE — 160036 HCHG PACU - EA ADDL 30 MINS PHASE I: Performed by: UROLOGY

## 2025-04-14 PROCEDURE — 700111 HCHG RX REV CODE 636 W/ 250 OVERRIDE (IP): Mod: JZ,UD | Performed by: ANESTHESIOLOGY

## 2025-04-14 PROCEDURE — 700111 HCHG RX REV CODE 636 W/ 250 OVERRIDE (IP): Mod: JZ,UD

## 2025-04-14 PROCEDURE — 700101 HCHG RX REV CODE 250: Mod: UD | Performed by: ANESTHESIOLOGY

## 2025-04-14 PROCEDURE — 52240 CYSTOSCOPY AND TREATMENT: CPT | Performed by: UROLOGY

## 2025-04-14 PROCEDURE — 80048 BASIC METABOLIC PNL TOTAL CA: CPT

## 2025-04-14 PROCEDURE — 160039 HCHG SURGERY MINUTES - EA ADDL 1 MIN LEVEL 3: Performed by: UROLOGY

## 2025-04-14 PROCEDURE — 700105 HCHG RX REV CODE 258: Mod: UD | Performed by: ANESTHESIOLOGY

## 2025-04-14 PROCEDURE — 700111 HCHG RX REV CODE 636 W/ 250 OVERRIDE (IP): Performed by: ANESTHESIOLOGY

## 2025-04-14 PROCEDURE — 88307 TISSUE EXAM BY PATHOLOGIST: CPT | Performed by: PATHOLOGY

## 2025-04-14 PROCEDURE — G0378 HOSPITAL OBSERVATION PER HR: HCPCS

## 2025-04-14 PROCEDURE — 85027 COMPLETE CBC AUTOMATED: CPT

## 2025-04-14 RX ORDER — HYDROMORPHONE HYDROCHLORIDE 1 MG/ML
0.2 INJECTION, SOLUTION INTRAMUSCULAR; INTRAVENOUS; SUBCUTANEOUS
Status: DISCONTINUED | OUTPATIENT
Start: 2025-04-14 | End: 2025-04-14 | Stop reason: HOSPADM

## 2025-04-14 RX ORDER — ONDANSETRON 2 MG/ML
4 INJECTION INTRAMUSCULAR; INTRAVENOUS
Status: DISCONTINUED | OUTPATIENT
Start: 2025-04-14 | End: 2025-04-14 | Stop reason: HOSPADM

## 2025-04-14 RX ORDER — OXYCODONE HYDROCHLORIDE 5 MG/1
5 TABLET ORAL
Status: DISCONTINUED | OUTPATIENT
Start: 2025-04-14 | End: 2025-04-15 | Stop reason: HOSPADM

## 2025-04-14 RX ORDER — PHENAZOPYRIDINE HYDROCHLORIDE 200 MG/1
200 TABLET, FILM COATED ORAL
Status: DISCONTINUED | OUTPATIENT
Start: 2025-04-14 | End: 2025-04-15 | Stop reason: HOSPADM

## 2025-04-14 RX ORDER — MORPHINE SULFATE 4 MG/ML
4 INJECTION INTRAVENOUS
Status: DISCONTINUED | OUTPATIENT
Start: 2025-04-14 | End: 2025-04-15 | Stop reason: HOSPADM

## 2025-04-14 RX ORDER — SODIUM CHLORIDE, SODIUM LACTATE, POTASSIUM CHLORIDE, CALCIUM CHLORIDE 600; 310; 30; 20 MG/100ML; MG/100ML; MG/100ML; MG/100ML
INJECTION, SOLUTION INTRAVENOUS
Status: DISCONTINUED | OUTPATIENT
Start: 2025-04-14 | End: 2025-04-14 | Stop reason: SURG

## 2025-04-14 RX ORDER — LABETALOL HYDROCHLORIDE 5 MG/ML
5 INJECTION, SOLUTION INTRAVENOUS
Status: DISCONTINUED | OUTPATIENT
Start: 2025-04-14 | End: 2025-04-14 | Stop reason: HOSPADM

## 2025-04-14 RX ORDER — HYDROMORPHONE HYDROCHLORIDE 1 MG/ML
0.1 INJECTION, SOLUTION INTRAMUSCULAR; INTRAVENOUS; SUBCUTANEOUS
Status: DISCONTINUED | OUTPATIENT
Start: 2025-04-14 | End: 2025-04-14 | Stop reason: HOSPADM

## 2025-04-14 RX ORDER — MIDAZOLAM HYDROCHLORIDE 1 MG/ML
INJECTION INTRAMUSCULAR; INTRAVENOUS PRN
Status: DISCONTINUED | OUTPATIENT
Start: 2025-04-14 | End: 2025-04-14 | Stop reason: SURG

## 2025-04-14 RX ORDER — CEFAZOLIN SODIUM 1 G/3ML
INJECTION, POWDER, FOR SOLUTION INTRAMUSCULAR; INTRAVENOUS PRN
Status: DISCONTINUED | OUTPATIENT
Start: 2025-04-14 | End: 2025-04-14 | Stop reason: SURG

## 2025-04-14 RX ORDER — OXYCODONE HCL 5 MG/5 ML
5 SOLUTION, ORAL ORAL
Status: COMPLETED | OUTPATIENT
Start: 2025-04-14 | End: 2025-04-14

## 2025-04-14 RX ORDER — ROCURONIUM BROMIDE 10 MG/ML
INJECTION, SOLUTION INTRAVENOUS PRN
Status: DISCONTINUED | OUTPATIENT
Start: 2025-04-14 | End: 2025-04-14 | Stop reason: SURG

## 2025-04-14 RX ORDER — ONDANSETRON 2 MG/ML
INJECTION INTRAMUSCULAR; INTRAVENOUS PRN
Status: DISCONTINUED | OUTPATIENT
Start: 2025-04-14 | End: 2025-04-14 | Stop reason: SURG

## 2025-04-14 RX ORDER — LIDOCAINE HYDROCHLORIDE 20 MG/ML
INJECTION, SOLUTION EPIDURAL; INFILTRATION; INTRACAUDAL; PERINEURAL PRN
Status: DISCONTINUED | OUTPATIENT
Start: 2025-04-14 | End: 2025-04-14 | Stop reason: SURG

## 2025-04-14 RX ORDER — AMOXICILLIN 250 MG
2 CAPSULE ORAL
Status: DISCONTINUED | OUTPATIENT
Start: 2025-04-14 | End: 2025-04-15 | Stop reason: HOSPADM

## 2025-04-14 RX ORDER — SCOPOLAMINE 1 MG/3D
1 PATCH, EXTENDED RELEASE TRANSDERMAL
Status: DISCONTINUED | OUTPATIENT
Start: 2025-04-14 | End: 2025-04-15 | Stop reason: HOSPADM

## 2025-04-14 RX ORDER — OXYCODONE HCL 5 MG/5 ML
10 SOLUTION, ORAL ORAL
Status: COMPLETED | OUTPATIENT
Start: 2025-04-14 | End: 2025-04-14

## 2025-04-14 RX ORDER — HALOPERIDOL 5 MG/ML
1 INJECTION INTRAMUSCULAR EVERY 6 HOURS PRN
Status: DISCONTINUED | OUTPATIENT
Start: 2025-04-14 | End: 2025-04-15 | Stop reason: HOSPADM

## 2025-04-14 RX ORDER — DIPHENHYDRAMINE HYDROCHLORIDE 50 MG/ML
12.5 INJECTION, SOLUTION INTRAMUSCULAR; INTRAVENOUS
Status: DISCONTINUED | OUTPATIENT
Start: 2025-04-14 | End: 2025-04-14 | Stop reason: HOSPADM

## 2025-04-14 RX ORDER — ACETAMINOPHEN 500 MG
1000 TABLET ORAL EVERY 6 HOURS
Status: DISCONTINUED | OUTPATIENT
Start: 2025-04-14 | End: 2025-04-15 | Stop reason: HOSPADM

## 2025-04-14 RX ORDER — HYDROMORPHONE HYDROCHLORIDE 1 MG/ML
0.4 INJECTION, SOLUTION INTRAMUSCULAR; INTRAVENOUS; SUBCUTANEOUS
Status: DISCONTINUED | OUTPATIENT
Start: 2025-04-14 | End: 2025-04-14 | Stop reason: HOSPADM

## 2025-04-14 RX ORDER — HYOSCYAMINE SULFATE 0.12 MG/1
0.12 TABLET SUBLINGUAL EVERY 4 HOURS PRN
Status: DISCONTINUED | OUTPATIENT
Start: 2025-04-14 | End: 2025-04-15 | Stop reason: HOSPADM

## 2025-04-14 RX ORDER — ONDANSETRON 2 MG/ML
4 INJECTION INTRAMUSCULAR; INTRAVENOUS EVERY 4 HOURS PRN
Status: DISCONTINUED | OUTPATIENT
Start: 2025-04-14 | End: 2025-04-15 | Stop reason: HOSPADM

## 2025-04-14 RX ORDER — MIDAZOLAM HYDROCHLORIDE 1 MG/ML
1 INJECTION INTRAMUSCULAR; INTRAVENOUS
Status: DISCONTINUED | OUTPATIENT
Start: 2025-04-14 | End: 2025-04-14 | Stop reason: HOSPADM

## 2025-04-14 RX ORDER — SODIUM CHLORIDE, SODIUM LACTATE, POTASSIUM CHLORIDE, CALCIUM CHLORIDE 600; 310; 30; 20 MG/100ML; MG/100ML; MG/100ML; MG/100ML
INJECTION, SOLUTION INTRAVENOUS CONTINUOUS
Status: ACTIVE | OUTPATIENT
Start: 2025-04-14 | End: 2025-04-14

## 2025-04-14 RX ORDER — HYDROXYZINE HYDROCHLORIDE 25 MG/1
25 TABLET, FILM COATED ORAL NIGHTLY PRN
Status: DISCONTINUED | OUTPATIENT
Start: 2025-04-14 | End: 2025-04-15 | Stop reason: HOSPADM

## 2025-04-14 RX ORDER — ACETAMINOPHEN 500 MG
1000 TABLET ORAL ONCE
Status: COMPLETED | OUTPATIENT
Start: 2025-04-14 | End: 2025-04-14

## 2025-04-14 RX ORDER — DEXAMETHASONE SODIUM PHOSPHATE 4 MG/ML
4 INJECTION, SOLUTION INTRA-ARTICULAR; INTRALESIONAL; INTRAMUSCULAR; INTRAVENOUS; SOFT TISSUE
Status: DISCONTINUED | OUTPATIENT
Start: 2025-04-14 | End: 2025-04-15 | Stop reason: HOSPADM

## 2025-04-14 RX ORDER — ACETAMINOPHEN 500 MG
1000 TABLET ORAL EVERY 6 HOURS PRN
Status: DISCONTINUED | OUTPATIENT
Start: 2025-04-19 | End: 2025-04-15 | Stop reason: HOSPADM

## 2025-04-14 RX ORDER — TAMSULOSIN HYDROCHLORIDE 0.4 MG/1
0.8 CAPSULE ORAL
COMMUNITY
End: 2025-04-22

## 2025-04-14 RX ORDER — SIMETHICONE 125 MG
125 TABLET,CHEWABLE ORAL 3 TIMES DAILY PRN
Status: DISCONTINUED | OUTPATIENT
Start: 2025-04-14 | End: 2025-04-15 | Stop reason: HOSPADM

## 2025-04-14 RX ORDER — OXYCODONE HYDROCHLORIDE 10 MG/1
10 TABLET ORAL
Status: DISCONTINUED | OUTPATIENT
Start: 2025-04-14 | End: 2025-04-15 | Stop reason: HOSPADM

## 2025-04-14 RX ORDER — HEPARIN SODIUM 5000 [USP'U]/ML
5000 INJECTION, SOLUTION INTRAVENOUS; SUBCUTANEOUS EVERY 8 HOURS
Status: DISCONTINUED | OUTPATIENT
Start: 2025-04-15 | End: 2025-04-15 | Stop reason: HOSPADM

## 2025-04-14 RX ORDER — HYDRALAZINE HYDROCHLORIDE 20 MG/ML
5 INJECTION INTRAMUSCULAR; INTRAVENOUS
Status: DISCONTINUED | OUTPATIENT
Start: 2025-04-14 | End: 2025-04-14 | Stop reason: HOSPADM

## 2025-04-14 RX ORDER — DIPHENHYDRAMINE HYDROCHLORIDE 50 MG/ML
25 INJECTION, SOLUTION INTRAMUSCULAR; INTRAVENOUS EVERY 6 HOURS PRN
Status: DISCONTINUED | OUTPATIENT
Start: 2025-04-14 | End: 2025-04-15 | Stop reason: HOSPADM

## 2025-04-14 RX ORDER — HALOPERIDOL 5 MG/ML
1 INJECTION INTRAMUSCULAR
Status: DISCONTINUED | OUTPATIENT
Start: 2025-04-14 | End: 2025-04-14 | Stop reason: HOSPADM

## 2025-04-14 RX ADMIN — FENTANYL CITRATE 100 MCG: 50 INJECTION, SOLUTION INTRAMUSCULAR; INTRAVENOUS at 11:45

## 2025-04-14 RX ADMIN — ACETAMINOPHEN 1000 MG: 500 TABLET, FILM COATED ORAL at 17:49

## 2025-04-14 RX ADMIN — LIDOCAINE HYDROCHLORIDE 100 MG: 20 INJECTION, SOLUTION EPIDURAL; INFILTRATION; INTRACAUDAL; PERINEURAL at 11:45

## 2025-04-14 RX ADMIN — PROPOFOL 150 MG: 10 INJECTION, EMULSION INTRAVENOUS at 11:45

## 2025-04-14 RX ADMIN — HYDROMORPHONE HYDROCHLORIDE 0.4 MG: 1 INJECTION, SOLUTION INTRAMUSCULAR; INTRAVENOUS; SUBCUTANEOUS at 14:46

## 2025-04-14 RX ADMIN — HYDROMORPHONE HYDROCHLORIDE 0.2 MG: 1 INJECTION, SOLUTION INTRAMUSCULAR; INTRAVENOUS; SUBCUTANEOUS at 15:07

## 2025-04-14 RX ADMIN — CEFAZOLIN 2 G: 1 INJECTION, POWDER, FOR SOLUTION INTRAMUSCULAR; INTRAVENOUS at 11:39

## 2025-04-14 RX ADMIN — SODIUM CHLORIDE, POTASSIUM CHLORIDE, SODIUM LACTATE AND CALCIUM CHLORIDE: 600; 310; 30; 20 INJECTION, SOLUTION INTRAVENOUS at 11:39

## 2025-04-14 RX ADMIN — LABETALOL HYDROCHLORIDE 5 MG: 5 INJECTION, SOLUTION INTRAVENOUS at 15:06

## 2025-04-14 RX ADMIN — MIDAZOLAM HYDROCHLORIDE 1 MG: 1 INJECTION, SOLUTION INTRAMUSCULAR; INTRAVENOUS at 13:46

## 2025-04-14 RX ADMIN — ACETAMINOPHEN 1000 MG: 500 TABLET, FILM COATED ORAL at 11:32

## 2025-04-14 RX ADMIN — FENTANYL CITRATE 50 MCG: 50 INJECTION, SOLUTION INTRAMUSCULAR; INTRAVENOUS at 14:11

## 2025-04-14 RX ADMIN — OXYCODONE HYDROCHLORIDE 10 MG: 5 SOLUTION ORAL at 14:36

## 2025-04-14 RX ADMIN — HYDROMORPHONE HYDROCHLORIDE 0.4 MG: 1 INJECTION, SOLUTION INTRAMUSCULAR; INTRAVENOUS; SUBCUTANEOUS at 15:00

## 2025-04-14 RX ADMIN — SUGAMMADEX 200 MG: 100 INJECTION, SOLUTION INTRAVENOUS at 12:56

## 2025-04-14 RX ADMIN — PHENAZOPYRIDINE 200 MG: 200 TABLET ORAL at 17:49

## 2025-04-14 RX ADMIN — ROCURONIUM BROMIDE 50 MG: 10 INJECTION INTRAVENOUS at 11:45

## 2025-04-14 RX ADMIN — FENTANYL CITRATE 50 MCG: 50 INJECTION, SOLUTION INTRAMUSCULAR; INTRAVENOUS at 13:40

## 2025-04-14 RX ADMIN — MIDAZOLAM HYDROCHLORIDE 1 MG: 1 INJECTION, SOLUTION INTRAMUSCULAR; INTRAVENOUS at 13:51

## 2025-04-14 RX ADMIN — FENTANYL CITRATE 50 MCG: 50 INJECTION, SOLUTION INTRAMUSCULAR; INTRAVENOUS at 13:42

## 2025-04-14 RX ADMIN — MIDAZOLAM HYDROCHLORIDE 2 MG: 1 INJECTION, SOLUTION INTRAMUSCULAR; INTRAVENOUS at 11:45

## 2025-04-14 RX ADMIN — SODIUM CHLORIDE, POTASSIUM CHLORIDE, SODIUM LACTATE AND CALCIUM CHLORIDE: 600; 310; 30; 20 INJECTION, SOLUTION INTRAVENOUS at 16:30

## 2025-04-14 RX ADMIN — HYOSCYAMINE SULFATE 0.12 MG: 0.12 TABLET SUBLINGUAL at 14:33

## 2025-04-14 RX ADMIN — FENTANYL CITRATE 50 MCG: 50 INJECTION, SOLUTION INTRAMUSCULAR; INTRAVENOUS at 13:44

## 2025-04-14 RX ADMIN — ONDANSETRON 4 MG: 2 INJECTION INTRAMUSCULAR; INTRAVENOUS at 11:45

## 2025-04-14 SDOH — ECONOMIC STABILITY: TRANSPORTATION INSECURITY
IN THE PAST 12 MONTHS, HAS LACK OF RELIABLE TRANSPORTATION KEPT YOU FROM MEDICAL APPOINTMENTS, MEETINGS, WORK OR FROM GETTING THINGS NEEDED FOR DAILY LIVING?: NO

## 2025-04-14 SDOH — ECONOMIC STABILITY: TRANSPORTATION INSECURITY
IN THE PAST 12 MONTHS, HAS THE LACK OF TRANSPORTATION KEPT YOU FROM MEDICAL APPOINTMENTS OR FROM GETTING MEDICATIONS?: NO

## 2025-04-14 ASSESSMENT — PATIENT HEALTH QUESTIONNAIRE - PHQ9
7. TROUBLE CONCENTRATING ON THINGS, SUCH AS READING THE NEWSPAPER OR WATCHING TELEVISION: NOT AT ALL
1. LITTLE INTEREST OR PLEASURE IN DOING THINGS: SEVERAL DAYS
6. FEELING BAD ABOUT YOURSELF - OR THAT YOU ARE A FAILURE OR HAVE LET YOURSELF OR YOUR FAMILY DOWN: NEARLY EVERY DAY
SUM OF ALL RESPONSES TO PHQ QUESTIONS 1-9: 16
2. FEELING DOWN, DEPRESSED, IRRITABLE, OR HOPELESS: SEVERAL DAYS
5. POOR APPETITE OR OVEREATING: NEARLY EVERY DAY
9. THOUGHTS THAT YOU WOULD BE BETTER OFF DEAD, OR OF HURTING YOURSELF: NOT AT ALL
4. FEELING TIRED OR HAVING LITTLE ENERGY: NEARLY EVERY DAY
SUM OF ALL RESPONSES TO PHQ9 QUESTIONS 1 AND 2: 2
3. TROUBLE FALLING OR STAYING ASLEEP OR SLEEPING TOO MUCH: NEARLY EVERY DAY
8. MOVING OR SPEAKING SO SLOWLY THAT OTHER PEOPLE COULD HAVE NOTICED. OR THE OPPOSITE, BEING SO FIGETY OR RESTLESS THAT YOU HAVE BEEN MOVING AROUND A LOT MORE THAN USUAL: MORE THAN HALF THE DAYS

## 2025-04-14 ASSESSMENT — SOCIAL DETERMINANTS OF HEALTH (SDOH)
WITHIN THE PAST 12 MONTHS, YOU WORRIED THAT YOUR FOOD WOULD RUN OUT BEFORE YOU GOT THE MONEY TO BUY MORE: SOMETIMES TRUE
WITHIN THE LAST YEAR, HAVE YOU BEEN HUMILIATED OR EMOTIONALLY ABUSED IN OTHER WAYS BY YOUR PARTNER OR EX-PARTNER?: NO
WITHIN THE LAST YEAR, HAVE YOU BEEN KICKED, HIT, SLAPPED, OR OTHERWISE PHYSICALLY HURT BY YOUR PARTNER OR EX-PARTNER?: NO
WITHIN THE LAST YEAR, HAVE YOU BEEN KICKED, HIT, SLAPPED, OR OTHERWISE PHYSICALLY HURT BY YOUR PARTNER OR EX-PARTNER?: NO
WITHIN THE LAST YEAR, HAVE YOU BEEN AFRAID OF YOUR PARTNER OR EX-PARTNER?: YES
WITHIN THE LAST YEAR, HAVE TO BEEN RAPED OR FORCED TO HAVE ANY KIND OF SEXUAL ACTIVITY BY YOUR PARTNER OR EX-PARTNER?: NO
WITHIN THE PAST 12 MONTHS, THE FOOD YOU BOUGHT JUST DIDN'T LAST AND YOU DIDN'T HAVE MONEY TO GET MORE: SOMETIMES TRUE
IN THE PAST 12 MONTHS, HAS THE ELECTRIC, GAS, OIL, OR WATER COMPANY THREATENED TO SHUT OFF SERVICE IN YOUR HOME?: NO
WITHIN THE LAST YEAR, HAVE YOU BEEN AFRAID OF YOUR PARTNER OR EX-PARTNER?: NO
WITHIN THE LAST YEAR, HAVE TO BEEN RAPED OR FORCED TO HAVE ANY KIND OF SEXUAL ACTIVITY BY YOUR PARTNER OR EX-PARTNER?: NO
WITHIN THE LAST YEAR, HAVE YOU BEEN HUMILIATED OR EMOTIONALLY ABUSED IN OTHER WAYS BY YOUR PARTNER OR EX-PARTNER?: YES

## 2025-04-14 ASSESSMENT — LIFESTYLE VARIABLES
EVER HAD A DRINK FIRST THING IN THE MORNING TO STEADY YOUR NERVES TO GET RID OF A HANGOVER: YES
AVERAGE NUMBER OF DAYS PER WEEK YOU HAVE A DRINK CONTAINING ALCOHOL: 1
EVER FELT BAD OR GUILTY ABOUT YOUR DRINKING: NO
TOTAL SCORE: 1
HAVE YOU EVER FELT YOU SHOULD CUT DOWN ON YOUR DRINKING: NO
TOTAL SCORE: 1
ALCOHOL_USE: YES
HAVE PEOPLE ANNOYED YOU BY CRITICIZING YOUR DRINKING: NO
ON A TYPICAL DAY WHEN YOU DRINK ALCOHOL HOW MANY DRINKS DO YOU HAVE: 2
CONSUMPTION TOTAL: POSITIVE
TOTAL SCORE: 1
DOES PATIENT WANT TO STOP DRINKING: NO
HOW MANY TIMES IN THE PAST YEAR HAVE YOU HAD 5 OR MORE DRINKS IN A DAY: 2

## 2025-04-14 ASSESSMENT — PAIN DESCRIPTION - PAIN TYPE
TYPE: SURGICAL PAIN
TYPE: ACUTE PAIN
TYPE: SURGICAL PAIN

## 2025-04-14 ASSESSMENT — FIBROSIS 4 INDEX
FIB4 SCORE: 1.06
FIB4 SCORE: 1.06

## 2025-04-14 ASSESSMENT — PAIN SCALES - GENERAL: PAIN_LEVEL: 2

## 2025-04-14 NOTE — ANESTHESIA PROCEDURE NOTES
Airway    Date/Time: 4/14/2025 11:58 AM    Performed by: Ra Marquez M.D.  Authorized by: Ra Marquez M.D.    Location:  OR  Urgency:  Elective  Indications for Airway Management:  Anesthesia      Spontaneous Ventilation: absent    Sedation Level:  Deep  Preoxygenated: Yes    Patient Position:  Sniffing  Final Airway Type:  Endotracheal airway  Final Endotracheal Airway:  ETT  Cuffed: Yes    Technique Used for Successful ETT Placement:  Direct laryngoscopy  Devices/Methods Used in Placement:  Anterior pressure/BURP    Insertion Site:  Oral  Blade Type:  Garcia  Laryngoscope Blade/Videolaryngoscope Blade Size:  2  ETT Size (mm):  8.0  Measured from:  Teeth  ETT to Teeth (cm):  22  Placement Verified by: auscultation and capnometry    Cormack-Lehane Classification:  Grade I - full view of glottis  Number of Attempts at Approach:  1

## 2025-04-14 NOTE — OR NURSING
Patient arrived in PACU, VSS. CBI in place draining light pink urine. Medicated patient for pain per MAR. He is alert and oriented x4. Belongings retrieved from locker and placed on gurney with patient. Report called to SHANTEL Guillen. Transported to [facility] on 2LO2.

## 2025-04-14 NOTE — OR NURSING
Assume care for patient in pre-op. Patient allergies, surgical procedure and NPO status discussed, patient said he had a hi chew candy around 11:00. Dr. Marquez aware and will proceed with surgery. PIV started and IVF infusing. Belongings secured in the locker

## 2025-04-14 NOTE — PROGRESS NOTES
Medication history reviewed with PT at bedside    [facility] is complete per PT reporting    Allergies reviewed.     Patient denies any outpatient antibiotics in the last 30 days.     Patient is not taking anticoagulants.    Dispense history is available in [employer].    Preferred pharmacy for this visit - [facility] melissa Gama ([phone])

## 2025-04-14 NOTE — PROGRESS NOTES
Pharmacy Chemotherapy verification:       Dx: Bladder Cancer        Protocol: Intravesical Gemcitabine     Dosing Reference:  Gemcitabine 2000 mg intravesical within 24 hours of TURBT (ideally within 6 hours) on Day 1     NCCN Guidelines for Bladder Cancer. V.1.2024.  Marcialing EM, et al. BRYON. 2018;319(18):3046-1003.  Darek P et al. J Urol. 2013;190(3):857-62.  Skyler EC, et al. J Urol. 2013;190(4):1200-4.     Wt 65.4 kg (144 lb 2.9 oz)   BMI 20.80 kg/m²     Body surface area is 1.79 meters squared.       Allergies: Sucralose  No labs required     Drug Order   (Drug name, dose, route, IV Fluid & volume, frequency, number of doses) Cycle: 1     Previous treatment: n/a      Medication = Gemcitabine  Base Dose= 2000 mg total  Calc Dose: No calc required  Final Dose = 2000 mg in cath tip syringe  Route = Intravesical  Fluid & Volume = NS 50mL in cath tip syringe  Administered by physician    To be administered by MD carver to treat with final dose      By my signature below, I confirm this process was performed independently with the BSA and all final chemotherapy dosing calculations congruent. I have reviewed the above chemotherapy order and that my calculation of the final dose and BSA (when applicable) corroborate those calculations of the  pharmacist. Discrepancies of 10% or greater in the written dose have been addressed and documented within the [facility] Progress notes.    Lisa Barker, PharmD, BCOP     No

## 2025-04-14 NOTE — ANESTHESIA PREPROCEDURE EVALUATION
Case: [id number] Date/Time: 04/14/25 1115    Procedure: TRANSURETHRAL RESECTION OF BLADDER TUMOR, INTRAVESICAL GEMCITABINE    Pre-op diagnosis: BLADDER TUMOR    Location: [facility] OR 18 / SURGERY [facility]    Surgeons: Gagan Leroy M.D.            Relevant Problems   PULMONARY   (positive) Mixed simple and mucopurulent chronic bronchitis (HCC)   (positive) Shortness of breath       Physical Exam    Airway   Mallampati: II  TM distance: >3 FB  Neck ROM: full       Cardiovascular - normal exam  Rhythm: regular  Rate: normal  (-) murmur     Dental - normal exam           Pulmonary - normal exam  Breath sounds clear to auscultation     Abdominal    Neurological - normal exam                   Anesthesia Plan    ASA 3   ASA physical status 3 criteria: CVA or TIA - history (> 3 months)    Plan - general       Airway plan will be ETT          Induction: intravenous    Postoperative Plan: Postoperative administration of opioids is intended.    Pertinent diagnostic labs and testing reviewed    Informed Consent:    Anesthetic plan and risks discussed with patient.    Use of blood products discussed with: patient whom consented to blood products.

## 2025-04-14 NOTE — H&P
Surgical H&P    Author: Gagan Leroy M.D. Date & Time created: 4/14/2025   9:49 AM     History:  53 yo with bladder tumor on office cysto. Has new diagnosis of bladder cancer. Presents for TURBT with gemcitabine.     Review of Systems   All other systems reviewed and are negative.    Hemodynamics:  No data recorded.     No data recorded         Respiratory:            Neuro:  GCS       Fluids:  No intake or output data in the 24 hours ending 04/14/25 0949  Weight: 65.4 kg (144 lb 2.9 oz)  No Active Diet Orders    Physical Exam  Constitutional:       Appearance: Normal appearance. He is normal weight.   Neurological:      Mental Status: He is alert.       Labs:  No results found for this or any previous visit (from the past 24 hours).  Medical Decision Making, by Problem:  There are no active hospital problems to display for this patient.    Plan:  TURB with gemcitabine. He understands risks to include bladder perforation and bleeding. He elects to proceed.     Gagan Leroy M.D., F.A.C.S.  Urologic Oncology  Vice-Chair, Department of Surgery  [facility]

## 2025-04-14 NOTE — PROGRESS NOTES
Pt admitted to room T428 via transport in [facility] from PACU at 1550.      AA&Ox4. Denies CP/SOB.  See 2 RN skin note  Tolerating regular diet. Denies N/V.  + void. + BM. Last BM PTA  Pt ambulates SBA.    Plan of care discussed, all questions answered. Educated on the importance of calling before getting OOB and pt verbalizes understanding. Educated regarding importance of oral care. Oral care kit at bedside. Call light is within reach, treaded slipper socks on, bed in lowest/ locked position, hourly rounding in place, all needs met at this time

## 2025-04-14 NOTE — PROGRESS NOTES
"Virtual Nurse rounding and admission profile complete.    Round Needs: Other: Patient asked for some ice water. and Notified of Patient Needs: CNA      When asking abuse screening patient at first was hesitant to answer questions and just stated \"No, it is just easier if I say no.\" After asking other admission questions this RN asked patient again regarding abuse questions. Patient stated that he was \"fearful of his kids\" and that he is \"the back burner in the house\".     Case management consult put in. Supervisor of virtual nursing department aware. Case management leader aware as well.     "

## 2025-04-14 NOTE — PROGRESS NOTES
Pharmacy Chemotherapy calculation:    DX: Bladder Cancer    Cycle 1  Previous treatment = none    Protocol: Intravesical Gemcitabine     Dosing Reference:  Gemcitabine 2000 mg intravesical within 24 hours of TURBT (ideally within 6 hours) on Day 1     NCCN Guidelines for Bladder Cancer. V.1.2024.  Shanae EM, et al. BRYON. 2018;319(18):8249-7180.  Darek P, et al. J Urol. 2013;190(3):857-62.  Skyler EC, et al. J Urol. 2013;190(4):1200-4.       There were no vitals taken for this visit.  There is no height or weight on file to calculate BSA.  LABS: not required       Gemcitabine 2000 mg in NS 50ml intravesically via cath tipped syringe.   Fixed dose, no calculation required. To be administered by MD.     Sydnie Manzanares, TawnyaD

## 2025-04-14 NOTE — ANESTHESIA POSTPROCEDURE EVALUATION
Patient: Gabriel Duran    Procedure Summary       Date: 04/14/25 Room / Location: [place] [address] / SURGERY [facility]    Anesthesia Start: 1139 Anesthesia Stop: 1342    Procedure: TRANSURETHRAL RESECTION OF BLADDER TUMOR >5 cm (Bladder) Diagnosis: (BLADDER TUMOR)    Surgeons: Gagan Leroy M.D. Responsible Provider: Ra Marquez M.D.    Anesthesia Type: general ASA Status: 3            Final Anesthesia Type: general  Last vitals  BP   Blood Pressure: 128/82    Temp   36.3 °C (97.4 °F)    Pulse   86   Resp   16    SpO2   96 %      Anesthesia Post Evaluation    Patient location during evaluation: PACU  Patient participation: complete - patient participated  Level of consciousness: agitated  Pain score: 2    Airway patency: patent  Anesthetic complications: no  Cardiovascular status: hemodynamically stable  Respiratory status: acceptable  Hydration status: euvolemic    PONV: none          There were no known notable events for this encounter.

## 2025-04-14 NOTE — ANESTHESIA TIME REPORT
Anesthesia Start and Stop Event Times       Date Time Event    4/14/2025 1122 Ready for Procedure     1139 Anesthesia Start     1342 Anesthesia Stop          Responsible Staff  04/14/25      Name Role Begin End    Ra Marquez M.D. Anesth 1139 1342          Overtime Reason:  no overtime (within assigned shift)    Comments:

## 2025-04-14 NOTE — OP REPORT
SURGEON: Dr. Jason Leroy     ANESTHESIA: General (general endotracheal tube)     PRE-OPERATIVE DIAGNOSIS: Bladder mass     POST-OPERATIVE DIAGNOSIS: Same     NAME OF PROCEDURE: Cystoscopy, transurethral resection of bladder tumor, no gemcitabine     FINDINGS OF PROCEDURE: Extensive bladder tumor involving entire right ans left trigone, bladder neck and prostatic urethra     EBL: 10cc     COMPLICATIONS: None     PATIENT CONDITION: stable     INDICATIONS: Gabriel Duran is a 54 y.o. male who agreed to above procedure for further management of Bladder mass after complete discussion of risks, benefits, and alternatives. The patient understands the main risks to be infection, bleeding, bladder perforation requiring an additional procedure or hines catheter, injury to surrounding structures.     PROCEDURE:     After informed consent was obtained in the preoperative care unit, the patient was taken to the OR on a stretcher. The patient was properly identified and placed in supine position per OR protocol. The patient was given a prophylactic dose of ancef 2 grams. General (general endotracheal tube) was administered. The patient was then placed in dorsal lithotomy, prepped and draped in a standard sterile fashion.  A timeout was performed with all parties in agreement.     A 26 Fr rigid resectoscope was inserted per urethra. A full inspection of the bladder was completed with a 30 degree lens. Neither ureteral orifices were noted, they were completely involved by tumor. I noted extensive tumor involving the trigone both lateral walls to the bladder neck and the entire prostatic urethra.  Ureteral orifices were not visualized.      The 26Fr bipolar resectoscope was used with the 0.3 mm bipolar loop. I began by resecting the superficial portions of the mass until the base was visible. Care was taken to achieve deep samples with muscle fibers present for proper staging. The resection bed was then extensively cauterized.  The specimens were evacuated from the bladder. I then re-inspected the bladder, noting good hemostasis of the resection bed. The remaining satellite lesions were resected and cauterized.  The tumor involving the entire prostatic urethra was not resected as likely this is muscle invasive and he will need cystoprostatectomy.  The UOs were not visualized at the conclusion of the extensive resection.. The bladder was insepcted while only partially full with inflow off, there was no active bleeding. The patient’s bladder was then emptied and the resectoscope was removed.  The patient had a 22 [place] three-way Mao inserted and 20 cc placed in the balloon.  CBI was commenced.  The patient was awoken from anesthesia and transferred to the PACU in stable condition.       DISPOSITION: The patient will be admitted.     Gagan Leroy M.D., F.A.C.S.  Urologic Oncology  Vice-Chair, Department of Surgery  [facility]

## 2025-04-15 VITALS
HEIGHT: 70 IN | WEIGHT: 139.11 LBS | TEMPERATURE: 98.4 F | HEART RATE: 74 BPM | BODY MASS INDEX: 19.92 KG/M2 | RESPIRATION RATE: 18 BRPM | OXYGEN SATURATION: 94 % | SYSTOLIC BLOOD PRESSURE: 146 MMHG | DIASTOLIC BLOOD PRESSURE: 96 MMHG

## 2025-04-15 LAB
ANION GAP SERPL CALC-SCNC: 9 MMOL/L (ref 7–16)
BUN SERPL-MCNC: 17 MG/DL (ref 8–22)
CALCIUM SERPL-MCNC: 8.8 MG/DL (ref 8.5–10.5)
CHLORIDE SERPL-SCNC: 106 MMOL/L (ref 96–112)
CO2 SERPL-SCNC: 25 MMOL/L (ref 20–33)
CREAT SERPL-MCNC: 0.98 MG/DL (ref 0.5–1.4)
ERYTHROCYTE [DISTWIDTH] IN BLOOD BY AUTOMATED COUNT: 45.7 FL (ref 35.9–50)
GFR SERPLBLD CREATININE-BSD FMLA CKD-EPI: 90 ML/MIN/1.73 M 2
GLUCOSE SERPL-MCNC: 117 MG/DL (ref 65–99)
HCT VFR BLD AUTO: 41.4 % (ref 42–52)
HGB BLD-MCNC: 13.6 G/DL (ref 14–18)
MCH RBC QN AUTO: 29.8 PG (ref 27–33)
MCHC RBC AUTO-ENTMCNC: 32.9 G/DL (ref 32.3–36.5)
MCV RBC AUTO: 90.6 FL (ref 81.4–97.8)
PLATELET # BLD AUTO: 250 K/UL (ref 164–446)
PMV BLD AUTO: 8.7 FL (ref 9–12.9)
POTASSIUM SERPL-SCNC: 4.2 MMOL/L (ref 3.6–5.5)
RBC # BLD AUTO: 4.57 M/UL (ref 4.7–6.1)
SODIUM SERPL-SCNC: 140 MMOL/L (ref 135–145)
WBC # BLD AUTO: 9 K/UL (ref 4.8–10.8)

## 2025-04-15 PROCEDURE — A9270 NON-COVERED ITEM OR SERVICE: HCPCS | Mod: UD | Performed by: UROLOGY

## 2025-04-15 PROCEDURE — 700111 HCHG RX REV CODE 636 W/ 250 OVERRIDE (IP): Performed by: UROLOGY

## 2025-04-15 PROCEDURE — 51798 US URINE CAPACITY MEASURE: CPT

## 2025-04-15 PROCEDURE — 85027 COMPLETE CBC AUTOMATED: CPT

## 2025-04-15 PROCEDURE — 700102 HCHG RX REV CODE 250 W/ 637 OVERRIDE(OP): Mod: UD | Performed by: UROLOGY

## 2025-04-15 PROCEDURE — 99231 SBSQ HOSP IP/OBS SF/LOW 25: CPT | Performed by: UROLOGY

## 2025-04-15 PROCEDURE — 770001 HCHG ROOM/CARE - MED/SURG/GYN PRIV*

## 2025-04-15 PROCEDURE — G0378 HOSPITAL OBSERVATION PER HR: HCPCS

## 2025-04-15 PROCEDURE — 80048 BASIC METABOLIC PNL TOTAL CA: CPT

## 2025-04-15 RX ORDER — OXYCODONE HYDROCHLORIDE 5 MG/1
5 TABLET ORAL EVERY 6 HOURS PRN
Qty: 12 TABLET | Refills: 0 | Status: SHIPPED | OUTPATIENT
Start: 2025-04-15 | End: 2025-04-18

## 2025-04-15 RX ADMIN — HEPARIN SODIUM 5000 UNITS: 5000 INJECTION, SOLUTION INTRAVENOUS; SUBCUTANEOUS at 05:20

## 2025-04-15 RX ADMIN — PHENAZOPYRIDINE 200 MG: 200 TABLET ORAL at 05:33

## 2025-04-15 RX ADMIN — ACETAMINOPHEN 1000 MG: 500 TABLET, FILM COATED ORAL at 00:38

## 2025-04-15 RX ADMIN — ACETAMINOPHEN 1000 MG: 500 TABLET, FILM COATED ORAL at 05:20

## 2025-04-15 RX ADMIN — SIMETHICONE 125 MG: 125 TABLET, CHEWABLE ORAL at 05:33

## 2025-04-15 ASSESSMENT — COGNITIVE AND FUNCTIONAL STATUS - GENERAL
DRESSING REGULAR LOWER BODY CLOTHING: A LITTLE
DAILY ACTIVITIY SCORE: 20
CLIMB 3 TO 5 STEPS WITH RAILING: A LITTLE
MOBILITY SCORE: 21
SUGGESTED CMS G CODE MODIFIER DAILY ACTIVITY: CJ
STANDING UP FROM CHAIR USING ARMS: A LITTLE
SUGGESTED CMS G CODE MODIFIER MOBILITY: CJ
TOILETING: A LITTLE
DRESSING REGULAR UPPER BODY CLOTHING: A LITTLE
WALKING IN HOSPITAL ROOM: A LITTLE
HELP NEEDED FOR BATHING: A LITTLE

## 2025-04-15 ASSESSMENT — PAIN DESCRIPTION - PAIN TYPE
TYPE: ACUTE PAIN

## 2025-04-15 ASSESSMENT — ENCOUNTER SYMPTOMS
SHORTNESS OF BREATH: 0
FEVER: 0
CHILLS: 0
FLANK PAIN: 0
NAUSEA: 0
ABDOMINAL PAIN: 0
VOMITING: 0

## 2025-04-15 NOTE — PROGRESS NOTES
Surgical Progress Note    Author: Darek Hirsch P.A.-C. Date & Time created: 4/15/2025   10:33 AM     Interval Events:  55 yo male s/p cystoscopy with extensive TURBT on 25 with Dr Leroy.    Patient reports that he is doing well. Some pain, tolerable with PO pain medications. Tolerating a regular diet.     Review of Systems   Constitutional:  Negative for chills and fever.   Respiratory:  Negative for shortness of breath.    Cardiovascular:  Negative for chest pain.   Gastrointestinal:  Negative for abdominal pain, nausea and vomiting.   Genitourinary:  Negative for flank pain.     Hemodynamics:  Temp (24hrs), Av.6 °C (97.9 °F), Min:36.2 °C (97.2 °F), Max:37.2 °C (99 °F)  Temperature: 37.2 °C (99 °F)  Pulse  Av.8  Min: 63  Max: 92   Blood Pressure: (!) 138/92     Respiratory:    Respiration: 18, Pulse Oximetry: 93 %        RUL Breath Sounds: Clear, RML Breath Sounds: Clear, RLL Breath Sounds: Clear, SHIRLEY Breath Sounds: Clear, LLL Breath Sounds: Clear  Neuro:  GCS       Fluids:    Intake/Output Summary (Last 24 hours) at 4/15/2025 1033  Last data filed at 4/15/2025 1000  Gross per 24 hour   Intake 1260 ml   Output 1050 ml   Net 210 ml        Current Diet Order   Procedures    Diet Order Diet: Regular     Physical Exam  Constitutional:       General: He is not in acute distress.  HENT:      Head: Normocephalic and atraumatic.   Pulmonary:      Effort: Pulmonary effort is normal. No respiratory distress.      Breath sounds: Normal breath sounds.   Abdominal:      General: There is no distension.      Palpations: Abdomen is soft.   Genitourinary:     Comments: 3-way hines catheter in situ, draining clear, yellow urine. Bladder was filled with approximately 200 cc irrigant and hines catheter was removed. Patient tolerated well without complaints.   Neurological:      Mental Status: He is alert.       Labs:  Recent Results (from the past 24 hours)   Histology Request    Collection Time: 25  1:18 PM    Result Value Ref Range    Pathology Request Sent to Histo    CBC without Differential    Collection Time: 04/14/25  2:09 PM   Result Value Ref Range    WBC 9.0 4.8 - 10.8 K/uL    RBC 4.41 (L) 4.70 - 6.10 M/uL    Hemoglobin 13.4 (L) 14.0 - 18.0 g/dL    Hematocrit 40.2 (L) 42.0 - 52.0 %    MCV 91.2 81.4 - 97.8 fL    MCH 30.4 27.0 - 33.0 pg    MCHC 33.3 32.3 - 36.5 g/dL    RDW 45.9 35.9 - 50.0 fL    Platelet Count 233 164 - 446 K/uL    MPV 8.7 (L) 9.0 - 12.9 fL   Basic Metabolic Panel    Collection Time: 04/14/25  2:09 PM   Result Value Ref Range    Sodium 142 135 - 145 mmol/L    Potassium 4.0 3.6 - 5.5 mmol/L    Chloride 104 96 - 112 mmol/L    Co2 27 20 - 33 mmol/L    Glucose 115 (H) 65 - 99 mg/dL    Bun 15 8 - 22 mg/dL    Creatinine 1.07 0.50 - 1.40 mg/dL    Calcium 8.9 8.5 - 10.5 mg/dL    Anion Gap 11.0 7.0 - 16.0   ESTIMATED GFR    Collection Time: 04/14/25  2:09 PM   Result Value Ref Range    GFR (CKD-EPI) 81 >60 mL/min/1.73 m 2   CBC without Differential    Collection Time: 04/15/25  6:12 AM   Result Value Ref Range    WBC 9.0 4.8 - 10.8 K/uL    RBC 4.57 (L) 4.70 - 6.10 M/uL    Hemoglobin 13.6 (L) 14.0 - 18.0 g/dL    Hematocrit 41.4 (L) 42.0 - 52.0 %    MCV 90.6 81.4 - 97.8 fL    MCH 29.8 27.0 - 33.0 pg    MCHC 32.9 32.3 - 36.5 g/dL    RDW 45.7 35.9 - 50.0 fL    Platelet Count 250 164 - 446 K/uL    MPV 8.7 (L) 9.0 - 12.9 fL   Basic Metabolic Panel    Collection Time: 04/15/25  6:12 AM   Result Value Ref Range    Sodium 140 135 - 145 mmol/L    Potassium 4.2 3.6 - 5.5 mmol/L    Chloride 106 96 - 112 mmol/L    Co2 25 20 - 33 mmol/L    Glucose 117 (H) 65 - 99 mg/dL    Bun 17 8 - 22 mg/dL    Creatinine 0.98 0.50 - 1.40 mg/dL    Calcium 8.8 8.5 - 10.5 mg/dL    Anion Gap 9.0 7.0 - 16.0   ESTIMATED GFR    Collection Time: 04/15/25  6:12 AM   Result Value Ref Range    GFR (CKD-EPI) 90 >60 mL/min/1.73 m 2     Medical Decision Making, by Problem:  Active Hospital Problems    Diagnosis     Bladder cancer (HCC) [C67.9]       Plan:  53 yo male s/p cystoscopy with extensive TURBT with Dr Leroy POD #1.    -- Regular diet  -- OOBAT   -- D/C home after patient successfully voids  -- Follow-up with urology pending final pathology -- Dr Leroy to arrange    Darek Hirsch PA-C    ** This patient was discussed with urologic surgeon, Dr Leroy who is in agreement of POC. The patient was also seen separately by Dr Leroy.

## 2025-04-15 NOTE — CARE PLAN
The patient is Stable - Low risk of patient condition declining or worsening         Progress made toward(s) clinical / shift goals:  Pt resting. Pain managed w/ medication per MD order.     Patient is not progressing towards the following goals:

## 2025-04-15 NOTE — PROGRESS NOTES
Pt states he is missing his glasses. This RN reached out to transport and PACU RN,no glasses found at this time.

## 2025-04-15 NOTE — PROGRESS NOTES
MD removed hines this morning, pt able to void small amount. MD requested another void and bladder scan post void.   Pt voided again and PVR 122mL.  Ok to [place] per PA.    Transferring pt to [place] lounge  Pt dressed and ready  Daughter at bedside  All belongings with pt at time of transfer

## 2025-04-15 NOTE — CARE PLAN
Problem: Pain - Standard  Goal: Alleviation of pain or a reduction in pain to the patient’s comfort goal  Outcome: Progressing     Problem: Fall Risk  Goal: Patient will remain free from falls  Outcome: Progressing   The patient is Stable - Low risk of patient condition declining or worsening    Shift Goals  Clinical Goals: pain and CBI mgt  Patient Goals: pain control    Progress made toward(s) clinical / shift goals:  yes

## 2025-04-15 NOTE — DISCHARGE PLANNING
Case Management Discharge Planning    Admission Date: 4/14/2025  GMLOS: 1.9  ALOS: 1    6-Clicks ADL Score: 20  6-Clicks Mobility Score: 21      Anticipated Discharge Dispo:      DME Needed: No    Action(s) Taken: Chart review completed. Discussed patient during IDT rounds.    Pt medically clear for discharge.     RN CM completed dc planning assessment with patient. RN SAMPSON addressed concerns pt had shared regarding family treatment with admitting RN.    Per pt, he is not being physically or verbally abused. He expressed feelings of how he doesn't feel like his adult children provide him enough emotional support.     Pt gave example that he came in for surgery yesterday and saw how other patient's had family with them during the check in process. He did not have any family present for himself. He feels like he is going through this alone and doesn't have as much support as he would like.     Per pt, his children are financially taking care of him, as he has no source of income. He believes that they expect him to do a lot of things around the house due to the financial assistance they provide.     Escalations Completed: other    Medically Clear: Yes    Next Steps: RN CM to follow up with IDT regarding DCP needs/barriers.     Barriers to Discharge: None        Care Transition Team Assessment    Case Management role discussed with patient; Patient verbalized understanding of the Case Management role.     Demographics on facesheet verified.     Please see H&P for pertinent PMH and reason for admission.     Residence: Patient lives in [place], [place] and resides with two adult sons, one adult daughter, and two of his children's significant others in a two story house.   Primary Care Provider (PCP): Kelly HAN  Preferred Pharmacy: [facility]  Advanced Directive: Patient does not an advanced directive.  Income and Source: Patient's monthly income is $0. Ot relies on his children for financial support. He receives $290 in SNAP  benefits.   Support System: Patient's support system includes his children whom he lives with.  Durable Medical Equipment (DME): No DME.   Past Services Utilized: Patient reports no previous services.   Activities of Daily Living & Instrumental Activities of Daily Living: Prior to admission, patient reports being independent with all ADLs and IADLs.  Substance Use History: Patient reports history of methamphetamine use in the 1980s. Per pt, it was for 2 years. He has been clean since then.   Mental Health History: Patient reports no history of mental health conditions.  Discharge Transportation: Pt's daughter will drive patient home upon discharge.      Information Source  Orientation Level: Oriented X4  Information Given By: Patient  Informant's Name: Gabriel Duran  Who is responsible for making decisions for patient? : Patient    Readmission Evaluation  Is this a readmission?: No    Elopement Risk  Legal Hold: No  Ambulatory or Self Mobile in Wheelchair: Yes  Disoriented: No  Psychiatric Symptoms: None  History of Wandering: No  Elopement this Admit: No  Vocalizing Wanting to Leave: No  Displays Behaviors, Body Language Wanting to Leave: No-Not at Risk for Elopement  Elopement Risk: Not at Risk for Elopement    Interdisciplinary Discharge Planning  Primary Care Physician: Kelly HAN  Lives with - Patient's Self Care Capacity: Adult Children  Patient or legal guardian wants to designate a caregiver: Yes  Caregiver name: Freda  Caregiver contact info: [phone]  Support Systems: Children, Family Member(s)  Housing / Facility: [address]    Discharge Preparedness  What is your plan after discharge?: Home with help  What are your discharge supports?: Child  Prior Functional Level: Ambulatory, Independent with Activities of Daily Living, Independent with Medication Management  Difficulity with ADLs: None  Difficulity with IADLs: None    Functional Assesment  Prior Functional Level: Ambulatory, Independent  with Activities of Daily Living, Independent with Medication Management    Finances  Financial Barriers to Discharge: Yes  Average Monthly Income: 0 $  Source of Income: None  Prescription Coverage: Yes    Vision / Hearing Impairment  Right Eye Vision: Impaired, Wears Glasses  Left Eye Vision: Impaired, Wears Glasses    Advance Directive  Advance Directive?: None  Advance Directive offered?: AD Booklet refused    Domestic Abuse  Physical Abuse or Sexual Abuse: No  Verbal Abuse or Emotional Abuse: No  Possible Abuse/Neglect Reported to:: Case Management    Psychological Assessment  History of Substance Abuse: Methamphetamine  Date Last Used - Methamphetamine: 1980's  Substance Abuse Comments: used for 2 years in the 80's  History of Psychiatric Problems: No  Non-compliant with Treatment: No  Newly Diagnosed Illness: Yes    Discharge Risks or Barriers  Discharge risks or barriers?: Uninsured / underinsured    Anticipated Discharge Information  Discharge Disposition: Discharged to home/self care (01)  Discharge Address: [address] [place] [id number]  Discharge Contact Phone Number: [phone]

## 2025-04-18 DIAGNOSIS — R39.15 URINARY URGENCY: ICD-10-CM

## 2025-04-18 NOTE — TELEPHONE ENCOUNTER
Received request via: Pharmacy    Was the patient seen in the last year in this department? Yes    Does the patient have an active prescription (recently filled or refills available) for medication(s) requested? No    Pharmacy Name: [facility]    Does the patient have half-way Plus and need 100-day supply? (This applies to ALL medications) Patient does not have SCP

## 2025-04-21 ENCOUNTER — TELEPHONE (OUTPATIENT)
Dept: UROLOGY | Facility: MEDICAL CENTER | Age: 55
End: 2025-04-21
Payer: MEDICAID

## 2025-04-21 NOTE — TELEPHONE ENCOUNTER
Gagan Leroy M.D.  P [facility]rology   Please have the patient come in sometime next week to talk to me about his pathology and surgical plan.    CE

## 2025-04-22 RX ORDER — TAMSULOSIN HYDROCHLORIDE 0.4 MG/1
0.8 CAPSULE ORAL
Qty: 60 CAPSULE | Refills: 5 | Status: SHIPPED | OUTPATIENT
Start: 2025-04-22
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
## 2025-04-29 ENCOUNTER — APPOINTMENT (OUTPATIENT)
Dept: UROLOGY | Facility: MEDICAL CENTER | Age: 55
End: 2025-04-29
Payer: MEDICAID
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
## 2025-05-13 ENCOUNTER — APPOINTMENT (OUTPATIENT)
Dept: UROLOGY | Facility: MEDICAL CENTER | Age: 55
End: 2025-05-13
Payer: MEDICAID

## 2025-05-13 ENCOUNTER — TELEPHONE (OUTPATIENT)
Dept: UROLOGY | Facility: MEDICAL CENTER | Age: 55
End: 2025-05-13

## 2025-05-13 DIAGNOSIS — C67.2 MALIGNANT NEOPLASM OF LATERAL WALL OF URINARY BLADDER (HCC): ICD-10-CM

## 2025-05-13 DIAGNOSIS — C61 PROSTATE CANCER (HCC): ICD-10-CM

## 2025-05-29 ENCOUNTER — TELEPHONE (OUTPATIENT)
Dept: UROLOGY | Facility: MEDICAL CENTER | Age: 55
End: 2025-05-29
Payer: MEDICAID

## 2025-06-10 ENCOUNTER — TELEPHONE (OUTPATIENT)
Dept: UROLOGY | Facility: MEDICAL CENTER | Age: 55
End: 2025-06-10
Payer: MEDICAID

## 2025-07-10 DIAGNOSIS — R06.02 SHORTNESS OF BREATH: ICD-10-CM

## 2025-07-10 RX ORDER — ALBUTEROL SULFATE 90 UG/1
2 INHALANT RESPIRATORY (INHALATION) EVERY 6 HOURS PRN
Qty: 8.5 EACH | Refills: 5 | Status: SHIPPED | OUTPATIENT
Start: 2025-07-10

## 2025-07-10 NOTE — TELEPHONE ENCOUNTER
Received request via: Pharmacy    Was the patient seen in the last year in this department? Yes    Does the patient have an active prescription (recently filled or refills available) for medication(s) requested? No    Pharmacy Name: [facility]    Does the patient have snf Plus and need 100-day supply? (This applies to ALL medications) Patient does not have SCP

## 2025-07-24 DIAGNOSIS — R06.02 SHORTNESS OF BREATH: ICD-10-CM

## 2025-07-24 RX ORDER — BUDESONIDE AND FORMOTEROL FUMARATE 80; 4.5 UG/1; UG/1
2 AEROSOL, METERED RESPIRATORY (INHALATION) 2 TIMES DAILY
Qty: 10.3 EACH | Refills: 5 | Status: SHIPPED | OUTPATIENT
Start: 2025-07-24

## 2025-07-24 NOTE — TELEPHONE ENCOUNTER
Received request via: Pharmacy    Was the patient seen in the last year in this department? Yes    Does the patient have an active prescription (recently filled or refills available) for medication(s) requested? No    Pharmacy Name: [facility]    Does the patient have FPC Plus and need 100-day supply? (This applies to ALL medications) Patient does not have SCP

## (undated) DEVICE — SENSOR OXIMETER ADULT SPO2 RD SET (20EA/BX)

## (undated) DEVICE — COVER FOOT UNIVERSAL DISP. - (25EA/CA)

## (undated) DEVICE — GLOVE BIOGEL SZ 8 SURGICAL PF LTX - (50PR/BX 4BX/CA)

## (undated) DEVICE — CONNECTOR HOSE NEPTUNE FOR CYSTO ROOM

## (undated) DEVICE — ELECTRODE DUAL RETURN W/ CORD - (50/PK)

## (undated) DEVICE — TUBING CLEARLINK DUO-VENT - C-FLO (48EA/CA)

## (undated) DEVICE — SET LEADWIRE 5 LEAD BEDSIDE DISPOSABLE ECG (1SET OF 5/EA)

## (undated) DEVICE — SET EXTENSION WITH 2 PORTS (48EA/CA) ***PART #2C8610 IS A SUBSTITUTE*****

## (undated) DEVICE — TUBE CONNECTING SUCTION - CLEAR PLASTIC STERILE 72 IN (50EA/CA)

## (undated) DEVICE — WATER IRRIG. STER 3000 ML - (4/CA)

## (undated) DEVICE — GOWN WARMING STANDARD FLEX - (30/CA)

## (undated) DEVICE — BAG DRAINAGE LINGEMAN CYSTO FOR GE/OEC 2600/2800 TABLES (20EA/CA) USE #28358

## (undated) DEVICE — SUCTION INSTRUMENT YANKAUER BULBOUS TIP W/O VENT (50EA/CA)

## (undated) DEVICE — CATHETER FOLEY 22 FR 30 CC (12EA/CA)

## (undated) DEVICE — ELECTRODE MONOPOLAR ANGLED CUTTING LOOP DIAM 0.35 YELLOW 24FR (6EA/PK)

## (undated) DEVICE — SPONGE GAUZESTER 4 X 4 4PLY - (128PK/CA)

## (undated) DEVICE — CANISTER SUCTION 3000ML MECHANICAL FILTER AUTO SHUTOFF MEDI-VAC NONSTERILE LF DISP (40EA/CA)

## (undated) DEVICE — SLEEVE VASO DVT COMPRESSION CALF MED - (10PR/CA)

## (undated) DEVICE — COVER LIGHT HANDLE ALC PLUS DISP (18EA/BX)

## (undated) DEVICE — LACTATED RINGERS INJ 1000 ML - (14EA/CA 60CA/PF)